# Patient Record
Sex: FEMALE | Race: AMERICAN INDIAN OR ALASKA NATIVE
[De-identification: names, ages, dates, MRNs, and addresses within clinical notes are randomized per-mention and may not be internally consistent; named-entity substitution may affect disease eponyms.]

---

## 2017-04-04 NOTE — ADMIT CRITERIA FORM
Admission Criteria Documentation: 





                            CARDIOLOGY GRG





Clinical Indications for Admission to Inpatient Care


    


                                                                               (

Place 'X' for any and all applicable criteria): 





Hospital admission is needed for appropriate care of the patient because of ANY 

ONE of the following (1): 





[ ] I.    Hemodynamic instability as indicated by ALL of the following (1)(2)(3)

(4)(5)


         [ ]a)   Vital signs or other findings not as expected for chronic 

patient condition or baseline


         [ ]b)   Instability indicated by ANY ONE of the following:


                  [ ]i)     Hypotension


                  [ ]ii)    Symptomatic Tachycardia unresponsive to treatment (

e.g., analgesia, fluids, sedation as indicated)


                  [ ]iii)   Inadequate perfusion indicated by ANY ONE of the 

following:


                            [ ] 1)   Lactic acidosis (> 2 mmol/L)


                            [ ] 2)   New abnormal capillary refill (> 3 seconds)


                            [ ] 3)   Reduced urine output


                            [ ] 4)   New altered mental status


                  [ ]iv)   Orthostatic vital sign changes unresponsive to 

treatment (e.g., fluids)              


                  [ ]v)    IV inotropic or vasopressor medication required to 

maintain adequate blood pressure or perfusion


[ ] II.  Severe heart failure as indicated by ANY ONE of the following(17)(18)


         [ ]a)  Respiratory distress        


         [ ]b)  Hypotension


         [ ]c)  Anasarca (refractory to outpatient therapy) 


         [ ]d)  Cardiac arrhythmias of immediate concern 


         [ ]e)  Myocardial ischemia


[ ] III. Cardiac arrhythmias or findings of immediate concern indicated by ANY 

ONE of the following (19)(20):


         [ ] a)  Heart rhythms that are inherently dangerous or unstable 

indicated by ANY ONE of the following (21)(22)(23):


                 [ ] i)    Resuscitated ventricular fibrillation or cardiac 

arrest


                 [ ] ii)   Ventricular escape rhythm


                 [ ] iii)  Sustained ventricular tachycardia (30 seconds or 

more of ventricular rhythm at greater than 100 beats per minute)


                 [ ] iv)  Nonsustained ventricular tachycardia and ANY ONE of 

the following:


                          [ ] 1)    Suspected cardiac ischemia as cause or 

consequence of ventricular tachycardia    


                          [ ] 2)    In setting of acute myocarditis         


         [ ] b)  Unstable cardiac conduction defects indicated by ANY ONE of 

the following(23)(24)(25)


                  [ ] i)    Type II second-degree atrioventricular block    


                  [ ]ii)    Third-degree atrioventricular block                


                  [ ]iii)    New-onset left bundle branch block with suspected 

myocardial ischemia


         [ ]c)   Any heart rhythm and ANY ONE of the following (21)(22)(26)(27) 

(28)


                  [ ] i)    Continuous long-term ECG monitoring needed (e.g., 

initiation of drug requiring monitoring for more than 24 hours)


                  [ ] ii)   Patient has automatic implanted cardioverter 

defibrillator that is repeatedly firing, malfunctioning, or in need of 

immediate 


                            adjustment of settings beyond the scope of 

ambulatory or observation care


        [ ]d)    Heart rhythms of concern due to ANY ONE of the following:


                  [ ] i)    Hypotension


                  [ ] ii)    Respiratory distress


                  [ ] iii)   Association with other significant symptoms (e.g., 

bradycardia with syncope or ongoing dizziness, supraventricular 


                            tachycardia with chest pain (14)(15)(17)


[ ] IV.   Monitoring for cardiac contusion beyond the scope of observation care 

needed [A](30)(31)(32)


[ ] V.    Surgical or device complication (e.g., valve replacement complication

, pacemaker dysfunction) (35)(41)(44)(45)(46)


[ ] VI.   Inpatient palliative care needed. [B](49) Also use Inpatient 

Palliative Care Criteria


[ ] VII.  Nonbacterial thrombotic (marantic) endocarditis (36)(43)(47)(48)


[ X] VIII. Cardiology condition, symptom, or finding for which emergency and 

observation care has failed or are not considered appropriate.


[ ] IX.   Acute valvular disease requiring inpatient as indicated by ANY ONE of 

the following (41)


          [ ]a)   Acute valvular regurgitation (42)


          [ ]b)   Noninfectious valvulitis (43)


          [ ]c)   Obstructive valve thrombosis 


          [ ]d)   Paravalvular leak


          [ ]e)   Other significant valvular disorder remaining after emergency 

or observation level of care (as appropriate)


[ ]X.    Pericardial disease requiring inpatient treatment as indicated by ANY 

ONE of the following (33)(34)(35)(36)(37)           


          [ ]a)   Suspected tamponade (38)(39)(40)


          [ ]b)   Hemopericardium


          [ ]c)   Other significant pericardial disorder remaining after 

emergency or observation level of care (as appropriate)


[ ] XI.  Cardiac ischemia beyond scope of emergency and observation care. 


[ ] XII. Hypertension requiring inpatient treatment as indicated by ANY ONE of 

the following (6)(7)(8)


         [ ]a)    SBP greater than 220 mm Hg or DBP greater than 120 mmHg 

despite treatment


         [ ]b)    SBP greater than 140 mm Hg or DBP greater than 100 mm Hg with 

evidence of acute end organ damage as indicated


                   by ANY ONE of the following


                   [ ] i)      Altered mental status


                   [ ] ii)     Acute renal failure as indicated by new onset of 

ANY ONE of the following (9)(10)(11)(12)(13)


                               [ ]1)  3-fold rise in serum creatinine from 

baseline


                               [ ]2)  Serum creatinine greater than 4 mg/dL (

354 micromoles/L) with acute rise greater than 0.5 mg/dL (44.2 micromoles/L)


                               [ ]3)  Reduction of more than 75% in estimated 

glomerular filtration rate from baseline 


                               [ ]4)  Estimated glomerular filtration rate less 

than 35 mL/min/1.73m2 (0.59 mL/sec/1.73m2) in child up to 18 years of age


                               [ ]5)  Cessation of urine output indicated by 

ALL of the following


                                       [ ]A.   Adequate volume status


                                       [ ]B.   Inadequate urine output as 

indicated by ANY ONE of the following       


                                                [ ]a.   Urine output less than 

0.3 mL/kg/hr for 24 hours


                                                [ ]b.   Anuria (urine output 

less than 0.1 mL/kg/hr) for 12 hours 


                  [ ] iii)      Aortic dissection


                  [ ] iv)      Myocardial Ischemia


                  [ ] v)       Left ventricular heart failure 


                  [ ]vi)       Retinal Hemorrhage


                  [ ]vii)      Other significant finding


          [ ]c)   Hypertension in child requiring inpatient treatment as 

indicated by ALL of the following(14)(15)(16)


                  [ ] i)        Outpatient treatment not effective, not 

available, or not appropriate               


                  [ ]ii)        SBP or DBP greater than 95th percentile for age


                  [ ]iii)       Evidence of acute end organ damage as indicated 

by ANY ONE of the following 


                               [ ]1)     Altered mental status


                               [ ]2)    Acute renal failure as indicated by new 

onset of ANY ONE of the following(9)(10)(11)(12)(13)


                                         [ ]A.    3-fold rise in serum 

creatinine from baseline


                                         [ ]B.    Serum creatinine greater than 

4 mg/dL (354 micromoles/L) with acute rise greater than 0.5 mg/dL (44.2 

micromoles/L)


                                         [ ]C.    Reduction of more than 75% in 

estimated glomerular filtration rate from baseline


                                         [ ]D.    Estimated glomerular 

filtration rate less than 35 mL/min/1.73m2 (0.59 mL/sec/1.73m2) in child up to 

18 years of age 


                                         [ ]E.    Cessation of urine output 

indicated by ALL of the following 


                                                   [ ]a.  Adequate volume status


                                                   [ ]b.  Inadequate urine 

output as indicated by ANY ONE of the following 


                                                           [ ]i)    Urine 

output less than 0.3 mL/kg/hr for 24 hours


                                                           [ ]ii)   Anuria (

urine output less than 0.1 mL/kg/hr) for 12 hours                              

  


                               [ ]3)  Severe headache


                               [ ]4)  Visual disturbance 


                               [ ]5)  Retinal hemorrhage


                               [ ]6)  Other significant finding


[ ]XIII.   Complications of transplanted heart indicated by ANY ONE of the 

following(61):


           [ ]a)  Acute graft rejection requiring inpatient management (eg, 

intravenous immunosuppression)(62)(63)


           [ ]b)  Acute graft heart failure indicated by ANY ONE of the 

following(64):


                   [ ]i)   Hemodynamic instability


                   [ ]ii)  Cardiac arrhythmias of immediate concern


                   [ ]iii) Pulmonary edema that is very severe (eg, mechanical 

ventilation needed,


                          imminent or likely, need for 100% oxygen to keep 

oxygen saturation above 90%)


                   [ ]iv) Pulmonary edema that is persistent as indicated by ALL

 of the following:


                          [ ]1)   New need for oxygen therapy to keep oxygen 

saturation above 90% (or increased FiO2 need from baseline)


                          [ ]2)   Has not improved sufficiently with emergency 

department or observation


                                   care IV diuretics or other heart failure 

treatments[E]


                   [ ]v)   Altered mental status that is severe or persistent


                   [ ]vi)   Increased creatinine (new on laboratory test) with 

reduction of more than 50% in


                            estimated glomerular filtration rate from baseline


                   [ ]vii)  Progressively (ongoing) rising creatinine (known 

from past laboratory test) with


                            reduction of more than 25% in estimated glomerular 

filtration rate from baseline


                   [ ]viii) Acute renal failure


                   [ ]ix)  Acute peripheral ischemia (eg, examination shows 

pulseless, cool, mottled, or cyanotic extremity)


                   [ ]x)   Pulmonary artery catheter monitoring needed


                   [ ]xi)  Other sign or symptom of heart failure requiring 

inpatient treatment (ie, too severe or


                            not responsive to outpatient and observation care 

treatment)


        [ ]c)    Infection requiring inpatient management (eg, Hemodynamic 

instability, need for intravenous antimicrobial treatment)(66)(67)(68)(69)(70)


        [ ]d)   Cardiac allograft vasculopathy requiring inpatient management (

eg evidence of cardiac ischemia)(71)


        [ ]e)   Other complication of transplanted heart (eg, stroke, severe 

pulmonary hypertension, severe valvular 


                 dysfunction) requiring inpatient management(72)








The original [facility] [employer] content created by [facility] has been revised. 


The portions of the content which have been revised are identified through the 

use of italic text or in bold, and [facility] 


has neither reviewed nor approved the modified material. All other unmodified 

content is copyright  [facility] [employer].





Please see references footnoted in the original [facility] [employer] edition 

2016





Admission Criteria Met: Yes

## 2017-04-04 NOTE — XRAY REPORT
CHEST TWO VIEWS: 04/04/17 11:35:00



CLINICAL: Shortness of breath.



COMPARISON: None



FINDINGS: Cardiomegaly and redistribution of pulmonary blood flow to 

the upper lobes.  The pulmonary vessels are indistinct.  Mild right 

basal patchy opacities.  No pleural effusion.    The bones and soft 

tissues are normal.



IMPRESSION: CHF with early interstitial pulmonary edema.Right middle 

lobe atelectasis versus airspace disease.

## 2017-04-04 NOTE — EMERGENCY DEPARTMENT REPORT
ED Chest Pain HPI





- General


Chief Complaint: Chest Pain


Stated Complaint: CHEST PAIN/SRIKANTH


Time Seen by Provider: 17 12:54


Source: patient


Mode of arrival: Ambulatory


Limitations: No Limitations





- History of Present Illness


Initial Comments: 





36-year-old female presents to the emergency department complaining of chest 

pain and difficulty breathing.  Patient reports onset of chest pain yesterday 

morning.  She describes a sharp pain in the center of her chest that does not 

radiate.  Symptoms have been constant since onset.  She reports associated 

diaphoresis and nausea.  She also reports cough productive of yellow sputum.  

There has been no fever.  There are no other complaints.


MD Complaint: chest pain


-: Gradual, days(s) (1)


Onset: during rest


Pain Location: substernal


Pain Radiation: none


Severity: severe


Severity scale (0 -10): 8


Quality: sharp


Consistency: constant


Improves With: nothing


Worsens With: nothing


re: nausea, diaphoresis, dyspnea


Other Symptoms: cough


Treatments Prior to Arrival: none


Aspirin use within the Past 7 Days: (0) No





- Related Data


 Home Medications











 Medication  Instructions  Recorded  Confirmed  Last Taken


 


Lisinopril/Hydrochlorothiazide 1 tab PO BID 09/14/15 03/16/16 10/01/15





[Zestoretic 20-12.5 mg]    


 


hydrALAZINE [Apresoline] 50 mg PO BID 09/14/15 03/16/16 10/01/15











 Allergies











Allergy/AdvReac Type Severity Reaction Status Date / Time


 


cyclobenzaprine HCl Allergy  Shortness Verified 17 12:03





[From Flexeril]   of Breath  


 


ibuprofen [From Motrin] Allergy  Swelling Verified 17 12:03


 


epideral medication Allergy  Swelling Uncoded 17 12:03














SHARATH score





- Sharath Score


Age > 65: (0) No


Aspirin use within the Past 7 Days: (0) No


3 or more CAD Risk Factors: (0) No


2 or more Angina events in past 24 hrs: (0) No


Known CAD with more than 50% Stenosis: (0) No


Elevated Cardiac Markers: (0) No


ST Deviation Greater than 0.5mm: (0) No


SHARATH Score: 0





ED Review of Systems


ROS: 


Stated complaint: CHEST PAIN/SRIKANTH


Other details as noted in HPI





Comment: All other systems reviewed and negative


Constitutional: diaphoresis


Respiratory: cough, shortness of breath


Cardiovascular: chest pain


Gastrointestinal: nausea





ED Past Medical Hx





- Past Medical History


Previous Medical History?: Yes


Hx Hypertension: Yes


Hx CVA: No


Hx Congestive Heart Failure: No


Hx Diabetes: No


Hx Asthma: No


Hx COPD: No





- Surgical History


Past Surgical History?: Yes


Hx Cholecystectomy: Yes


Additional Surgical History: "Ovarian surgery",.   x 4.  TUBAL LIGATION





- Family History


Family history: no significant





- Social History


Smoking Status: Former Smoker


Substance Use Type: None





- Medications


Home Medications: 


 Home Medications











 Medication  Instructions  Recorded  Confirmed  Last Taken  Type


 


Lisinopril/Hydrochlorothiazide 1 tab PO BID 09/14/15 03/16/16 10/01/15 History





[Zestoretic 20-12.5 mg]     


 


hydrALAZINE [Apresoline] 50 mg PO BID 09/14/15 03/16/16 10/01/15 History














ED Physical Exam





- General


Limitations: No Limitations


General appearance: alert, in no apparent distress





- Head


Head exam: Present: atraumatic, normocephalic





- Eye


Eye exam: Present: normal appearance, PERRL, EOMI





- ENT


ENT exam: Present: normal exam, normal orophraynx, mucous membranes moist





- Neck


Neck exam: Present: normal inspection, full ROM.  Absent: tenderness





- Respiratory


Respiratory exam: Present: normal lung sounds bilaterally.  Absent: respiratory 

distress





- Cardiovascular


Cardiovascular Exam: Present: regular rate, normal rhythm, normal heart sounds





- GI/Abdominal


GI/Abdominal exam: Present: soft, normal bowel sounds.  Absent: distended, 

tenderness





- Extremities Exam


Extremities exam: Present: normal inspection, full ROM.  Absent: tenderness





- Back Exam


Back exam: Present: normal inspection, full ROM.  Absent: tenderness





- Neurological Exam


Neurological exam: Present: alert, oriented X3.  Absent: motor sensory deficit





- Skin


Skin exam: Present: warm, dry, intact





ED Course





 Vital Signs











  17





  12:05 12:49 13:01


 


Temperature 98.3 F  


 


Pulse Rate 100 H 98 H 98 H


 


Respiratory 26 H  15





Rate   


 


Blood Pressure 208/142  204/151


 


O2 Sat by Pulse 100  100





Oximetry   














  17





  13:11 13:20


 


Temperature  


 


Pulse Rate  97 H


 


Respiratory 18 





Rate  


 


Blood Pressure  193/136


 


O2 Sat by Pulse  





Oximetry  














ED Medical Decision Making





- Lab Data


Result diagrams: 


 17 12:14





 17 12:14





- EKG Data


-: EKG Interpreted by Me


EKG shows normal: sinus rhythm, axis, intervals


Rate: normal





- EKG Data


When compared to previous EKG there are: previous EKG unavailable


Interpretation: nonspecific ST-T wave sen, LVH





- Radiology Data


Radiology results: report reviewed, image reviewed





Chest x-ray shows findings consistent with CHF.





- Medical Decision Making





Lab and imaging results reviewed and discussed with the patient.  I've spoken 

with cardiology.  Patient is to be admitted by the hospitalist.





- Differential Diagnosis


ACS, hypertensive crisis, CHF


Critical care attestation.: 


If time is entered above; I have spent that time in minutes in the direct care 

of this critically ill patient, excluding procedure time.








ED Disposition


Clinical Impression: 


CHF (congestive heart failure)


Qualifiers:


 Congestive heart failure type: unspecified congestive heart failure type 

Congestive heart failure chronicity: acute Qualified Code(s): I50.9 - Heart 

failure, unspecified





Disposition: OP ADMITTED AS IP TO THIS HOSP


Is pt being admited?: Yes


Condition: Stable


Referrals: 


ERINNE,SAMUEL C, MD [Primary Care Provider] - 3-5 Days


Time of Disposition: 14:15

## 2017-04-04 NOTE — EVENT NOTE
Date: 04/04/17


See H/p in reports


Hypertensive emergency


Acute CHF exacerbation


Chest pain-r/o MI

## 2017-04-04 NOTE — CONSULTATION
History of Present Illness


Consult date: 17


Requesting physician: NIDA PELAYO


Consult reason: congestive heart failure


History of present illness: 


The patient is a 36-year-old -American female with a past medical 

history significant for hypertension.  She is previously unknown to our 

practice.  She presented with complaints of chest pain, diaphoresis, shortness 

of breath, and orthopnea 2 days prior to arrival.  She reports that her 

symptoms began gradually and have progressively worsened over the course of 2 

days.  She describes her chest pain as a nonexertional, nonradiating, 

intermittent midsternal stabbing pain which is aggravated by deep inspiration 

and reproducible with palpation.  She denies any fever, chills, cough, 

palpitations, nausea, vomiting, dizziness, or syncope.  She denies any recent 

illness.  She also reports some intermittent bilateral ankle swelling over the 

past few days.  She reports compliance with her antihypertensive medication 

regimen.  On arrival to the ED, her initial BP was noted to be 208/142; CXR 

revealed early interstitial pulmonary edema; pro-BNP 4800; troponin negative x 

1 set. 








Past History


Past Medical History: hypertension


Past Surgical History: Other (tubal ligation;  x 4)


Social history: no significant social history.  denies: smoking, alcohol abuse, 

prescription drug abuse


Family history: no significant family history





Medications and Allergies


 Allergies











Allergy/AdvReac Type Severity Reaction Status Date / Time


 


cyclobenzaprine HCl Allergy  Shortness Verified 17 12:03





[From Flexeril]   of Breath  


 


ibuprofen [From Motrin] Allergy  Swelling Verified 17 12:03


 


epideral medication Allergy  Swelling Uncoded 17 12:03











 Home Medications











 Medication  Instructions  Recorded  Confirmed  Last Taken  Type


 


Lisinopril/Hydrochlorothiazide 1 tab PO BID 09/14/15 04/04/17 10/01/15 History





[Zestoretic 20-12.5 mg]     


 


hydrALAZINE [Apresoline] 50 mg PO DAILY 09/14/15 04/04/17 10/01/15 History











Active Meds: 


Active Medications





Nitroglycerin/Dextrose (Tridil Drip 50mg/250ml)  50 mg in 250 mls @ 3 mls/hr IV 

TITR ONE; 10 MCG/MIN


   PRN Reason: Protocol


   Stop: 17 01:38


   Last Admin: 17 14:30 Dose:  10 mcg/min, 3 mls/hr


Nitroglycerin (Nitrostat)  0.4 mg SL .Q5MIN PRN


   PRN Reason: Chest Pain


   Last Admin: 17 14:04 Dose:  0.4 mg











Review of Systems


Constitutional: sweats, no weight loss, no weight gain, no fever, no chills


Ears, nose, mouth and throat: no ear pain, no ear discharge, no nose pain, no 

nasal congestion, no sinus pressure, no sinus pain, no epistaxis, no bleeding 

gums, no dental pain, no mouth pain, no dysphagia, no hoarseness, no sore throat


Cardiovascular: chest pain, orthopnea, edema, shortness of breath, high blood 

pressure, leg edema, no palpitations, no rapid/irregular heart beat, no syncope

, no lightheadedness, no dyspnea on exertion


Respiratory: shortness of breath, dyspnea on exertion, pain on inspiration, no 

cough, no congestion, no wheezing, no sleep apnea


Gastrointestinal: no abdominal pain, no nausea, no vomiting, no diarrhea, no 

constipation, no change in bowel habits


Genitourinary Female: no dyspareunia, no dysmenorrhea, no pelvic pain, no flank 

pain, no menorrhagia, no dysuria, no urinary frequency, no urgency


Musculoskeletal: no neck stiffness, no neck pain, no shooting arm pain, no arm 

numbness/tingling, no low back pain, no shooting leg pain, no leg numbness/

tingling


Integumentary: no rash, no pruritis, no redness, no sores, no wounds


Neurological: no head injury, no weakness, no parathesias, no numbness, no 

tingling, no seizures, no syncope


Psychiatric: no anxiety


Endocrine: no cold intolerance, no heat intolerance


Hematologic/Lymphatic: no easy bruising, no easy bleeding, no lymphadenopathy


Allergic/Immunologic: no urticaria, no wheezing, no persistent infections





Physical Examination





 Last Vital Signs











Temp  98.3 F   17 12:05


 


Pulse  102 H  17 14:15


 


Resp  20   17 14:15


 


BP  200/138   17 14:15


 


Pulse Ox  96   17 14:15








 








General appearance: mild distress


HEENT: Positive: PERRL, Normocephaly, Mucus Membranes Moist


Neck: Positive: neck supple, trachea midline


Cardiac: Positive: Reg Rate and Rhythm, S1/S2


Lungs: Positive: Rales (bibasilar)


Neuro: Positive: Grossly Intact, Cranial Nerve 2-12 Intact


Abdomen: Positive: Unremarkable, Soft, Active Bowel Sounds.  Negative: Tender


Skin: Positive: Clear.  Negative: Rash, Wound


Extremities: Present: upper extr. pulses, lower extr. pulses.  Absent: edema





Results





 17 12:14





 17 12:14





- Imaging and Cardiology


Echo: pending


EKG: report reviewed, image reviewed





EKG interpretations





- Telemetry


EKG Rhythm: Sinus Rhythm





- EKG


Sinus rhythms and dysrhythmias: sinus rhythm


Repolarization changes or abnormalities: nonspecific abnormality, ST segment, 

and/or T wave





Assessment and Plan


Assessment:


Chest pain, atypical - reproducible with palpation; ECG with NAF; troponin 

negative x 1 set. 


Acute heart failure


Hypertensive urgency 





Plan:


Agree with nitro gtt per primary. Wean off for SBP <160mmHg. 


Initiate Lopressor, 50mg PO BID. Titrate as tolerated. 


Initiate lisinopril, 40mg daily, and HCTZ, 25mg daily. 


Initiate diuresis with IV lasix, 40mg BID. Repeat BMP in AM. 


Obtain echo. 


Repeat EKG in AM. 


Cont to trend troponins. 


Obtain fasting lipid panel in AM. 


Cont tele. 


Tx to ICU. 


Assessment and plan reviewed with pt at bedside. 





The patient has been seen in conjunction with Dr. Tracey who agrees with the 

assessment and plan of care.

## 2017-04-05 NOTE — PROGRESS NOTE
Subjective


Date of service: 04/05/17


Principal diagnosis: chest pain; HTN; HF


Interval history: 





Had cardiac cath today





Objective





- Constitutional


Vitals: 


 Vital Signs - 12hr











  04/05/17 04/05/17 04/05/17





  02:08 02:16 02:30


 


Temperature   


 


Pulse Rate  78 75


 


Respiratory 22 18 17





Rate   


 


Blood Pressure  176/114 176/114


 


O2 Sat by Pulse  94 99





Oximetry   














  04/05/17 04/05/17 04/05/17





  02:46 03:00 03:16


 


Temperature   


 


Pulse Rate 80 84 81


 


Respiratory 25 H 26 H 27 H





Rate   


 


Blood Pressure 177/100 177/100 177/100


 


O2 Sat by Pulse 97 93 97





Oximetry   














  04/05/17 04/05/17 04/05/17





  03:30 03:46 04:00


 


Temperature   98.9 F


 


Pulse Rate 79 84 80


 


Respiratory 24 18 21





Rate   


 


Blood Pressure 174/105 197/127 179/93


 


O2 Sat by Pulse 99 96 98





Oximetry   














  04/05/17 04/05/17 04/05/17





  04:16 04:30 04:46


 


Temperature   


 


Pulse Rate 74 77 77


 


Respiratory 25 H 20 24





Rate   


 


Blood Pressure 201/127 201/127 194/110


 


O2 Sat by Pulse 97 96 96





Oximetry   














  04/05/17 04/05/17 04/05/17





  05:00 05:16 05:30


 


Temperature   


 


Pulse Rate 82 79 78


 


Respiratory 20 30 H 26 H





Rate   


 


Blood Pressure 194/110 202/116 202/116


 


O2 Sat by Pulse 96 95 97





Oximetry   














  04/05/17 04/05/17 04/05/17





  05:39 05:46 05:50


 


Temperature   


 


Pulse Rate 77 74 84


 


Respiratory 25 H 19 20





Rate   


 


Blood Pressure 212/125 212/125 180/112


 


O2 Sat by Pulse  94 94





Oximetry   














  04/05/17 04/05/17 04/05/17





  05:56 06:00 06:06


 


Temperature   


 


Pulse Rate 93 H 89 92 H


 


Respiratory 16 30 H 31 H





Rate   


 


Blood Pressure 180/112 131/74 131/74


 


O2 Sat by Pulse 95 99 100





Oximetry   














  04/05/17 04/05/17 04/05/17





  06:10 06:16 06:20


 


Temperature   


 


Pulse Rate 82 74 80


 


Respiratory 17 25 H 24





Rate   


 


Blood Pressure 131/74 134/85 134/85


 


O2 Sat by Pulse 100 98 97





Oximetry   














  04/05/17 04/05/17 04/05/17





  06:26 06:30 06:35


 


Temperature   


 


Pulse Rate 76 78 78


 


Respiratory 25 H 25 H 26 H





Rate   


 


Blood Pressure 161/99 161/99 141/99


 


O2 Sat by Pulse 95 99 99





Oximetry   














  04/05/17 04/05/17 04/05/17





  06:40 06:46 06:50


 


Temperature   


 


Pulse Rate 79 84 83


 


Respiratory 24 22 23





Rate   


 


Blood Pressure 141/99 141/99 147/82


 


O2 Sat by Pulse 98 98 97





Oximetry   














  04/05/17 04/05/17 04/05/17





  06:56 07:00 07:06


 


Temperature   


 


Pulse Rate 85 80 82


 


Respiratory 23 22 14





Rate   


 


Blood Pressure 147/82 147/82 135/80


 


O2 Sat by Pulse 97 95 95





Oximetry   














  04/05/17 04/05/17 04/05/17





  07:10 07:16 07:20


 


Temperature   


 


Pulse Rate 79 80 84


 


Respiratory 25 H 23 25 H





Rate   


 


Blood Pressure 135/80 135/80 131/86


 


O2 Sat by Pulse 96 97 96





Oximetry   














  04/05/17 04/05/17 04/05/17





  07:26 07:30 07:36


 


Temperature   


 


Pulse Rate 83 85 82


 


Respiratory 19 22 21





Rate   


 


Blood Pressure 131/86 131/86 138/90


 


O2 Sat by Pulse 96 96 98





Oximetry   














  04/05/17 04/05/17 04/05/17





  07:40 07:45 07:50


 


Temperature   


 


Pulse Rate 85 85 85


 


Respiratory 23 22 16





Rate   


 


Blood Pressure 138/90 142/88 142/88


 


O2 Sat by Pulse 96 98 94





Oximetry   














  04/05/17 04/05/17 04/05/17





  07:56 08:00 08:36


 


Temperature  98.1 F 


 


Pulse Rate 84  80


 


Respiratory 17  23





Rate   


 


Blood Pressure 142/88  142/88


 


O2 Sat by Pulse  96 98





Oximetry   














  04/05/17 04/05/17 04/05/17





  08:40 08:45 08:50


 


Temperature   


 


Pulse Rate 83 82 85


 


Respiratory 19 22 18





Rate   


 


Blood Pressure 142/88 154/106 154/106


 


O2 Sat by Pulse 98 97 96





Oximetry   














  04/05/17 04/05/17 04/05/17





  08:56 09:00 09:06


 


Temperature   


 


Pulse Rate 81 80 80


 


Respiratory 22 21 21





Rate   


 


Blood Pressure 154/106 154/106 154/106


 


O2 Sat by Pulse 97 98 98





Oximetry   














  04/05/17 04/05/17 04/05/17





  09:10 09:15 09:20


 


Temperature   


 


Pulse Rate 86 85 82


 


Respiratory 17 23 18





Rate   


 


Blood Pressure 154/106 166/104 166/104


 


O2 Sat by Pulse 99 97 98





Oximetry   














  04/05/17 04/05/17 04/05/17





  09:26 09:30 09:36


 


Temperature   


 


Pulse Rate 83 87 84


 


Respiratory 14 17 27 H





Rate   


 


Blood Pressure 166/104 166/104 180/109


 


O2 Sat by Pulse 99 98 94





Oximetry   














  04/05/17 04/05/17 04/05/17





  09:40 09:46 09:50


 


Temperature   


 


Pulse Rate 84 84 83


 


Respiratory 28 H 26 H 20





Rate   


 


Blood Pressure 180/109 182/106 182/106


 


O2 Sat by Pulse 95 95 95





Oximetry   














  04/05/17 04/05/17 04/05/17





  09:56 10:00 10:06


 


Temperature   


 


Pulse Rate 84 75 88


 


Respiratory 25 H 25 H 24





Rate   


 


Blood Pressure 182/106 182/106 183/112


 


O2 Sat by Pulse 96 94 97





Oximetry   














  04/05/17 04/05/17 04/05/17





  10:10 10:16 10:20


 


Temperature   


 


Pulse Rate 83 85 85


 


Respiratory 24 26 H 16





Rate   


 


Blood Pressure 183/112 175/111 175/111


 


O2 Sat by Pulse 98 97 96





Oximetry   














  04/05/17 04/05/17 04/05/17





  12:00 12:18 12:20


 


Temperature   


 


Pulse Rate  76 77


 


Respiratory  17 13





Rate   


 


Blood Pressure   


 


O2 Sat by Pulse 96  100





Oximetry   














  04/05/17 04/05/17





  12:26 12:30


 


Temperature  


 


Pulse Rate 82 80


 


Respiratory 15 11 L





Rate  


 


Blood Pressure 166/108 166/108


 


O2 Sat by Pulse 97 98





Oximetry  











General appearance: Present: no acute distress, well-nourished





- EENT


Eyes: PERRL, EOM intact





- Neck


Neck: supple, normal ROM





- Respiratory


Respiratory effort: normal


Respiratory: bilateral: CTA





- Cardiovascular


Rhythm: regular


Heart Sounds: Present: S1 & S2.  Absent: gallop, rub


Extremities: pulses intact, No edema, normal color, Full ROM





- Gastrointestinal


General gastrointestinal: Present: soft, non-tender





- Genitourinary


Female genitourinary: normal





- Integumentary


Integumentary: clear, warm, dry





- Musculoskeletal


Musculoskeletal: 1, strength equal bilaterally





- Neurologic


Neurologic: moves all extremities





- Psychiatric


Psychiatric: memory intact, appropriate mood/affect, intact judgment & insight





- Labs


CBC & Chem 7: 


 04/04/17 12:14





 04/05/17 03:24


Labs: 


 Abnormal lab results











  04/04/17 04/05/17 Range/Units





  17:37 00:44 


 


CK-MB (CK-2) Rel Index  4.7 H   (0-4)  


 


Troponin T  0.031 H D  0.037 H  (0.00-0.029)  ng/mL


 


HDL Cholesterol  28 L   (40-59)  mg/dL

## 2017-04-05 NOTE — PROGRESS NOTE
Assessment and Plan








1. Chest pain, atypical - reproducible with palpation; ECG with NAF; troponin 

negative x 1 set. Had coronary angio. 


2. Acute heart failure:


3. Accelerated HTN: Off NTG gtt








 








Subjective


Date of service: 04/05/17


Principal diagnosis: chest pain; HTN; HF


Interval history: 





Had cardiac cath today. No more chest pain





Objective





- Constitutional


Vitals: 


 Vital Signs - 12hr











  04/05/17 04/05/17 04/05/17





  02:16 02:30 02:46


 


Temperature   


 


Pulse Rate 78 75 80


 


Respiratory 18 17 25 H





Rate   


 


Blood Pressure 176/114 176/114 177/100


 


O2 Sat by Pulse 94 99 97





Oximetry   














  04/05/17 04/05/17 04/05/17





  03:00 03:16 03:30


 


Temperature   


 


Pulse Rate 84 81 79


 


Respiratory 26 H 27 H 24





Rate   


 


Blood Pressure 177/100 177/100 174/105


 


O2 Sat by Pulse 93 97 99





Oximetry   














  04/05/17 04/05/17 04/05/17





  03:46 04:00 04:16


 


Temperature  98.9 F 


 


Pulse Rate 84 80 74


 


Respiratory 18 21 25 H





Rate   


 


Blood Pressure 197/127 179/93 201/127


 


O2 Sat by Pulse 96 98 97





Oximetry   














  04/05/17 04/05/17 04/05/17





  04:30 04:46 05:00


 


Temperature   


 


Pulse Rate 77 77 82


 


Respiratory 20 24 20





Rate   


 


Blood Pressure 201/127 194/110 194/110


 


O2 Sat by Pulse 96 96 96





Oximetry   














  04/05/17 04/05/17 04/05/17





  05:16 05:30 05:39


 


Temperature   


 


Pulse Rate 79 78 77


 


Respiratory 30 H 26 H 25 H





Rate   


 


Blood Pressure 202/116 202/116 212/125


 


O2 Sat by Pulse 95 97 





Oximetry   














  04/05/17 04/05/17 04/05/17





  05:46 05:50 05:56


 


Temperature   


 


Pulse Rate 74 84 93 H


 


Respiratory 19 20 16





Rate   


 


Blood Pressure 212/125 180/112 180/112


 


O2 Sat by Pulse 94 94 95





Oximetry   














  04/05/17 04/05/17 04/05/17





  06:00 06:06 06:10


 


Temperature   


 


Pulse Rate 89 92 H 82


 


Respiratory 30 H 31 H 17





Rate   


 


Blood Pressure 131/74 131/74 131/74


 


O2 Sat by Pulse 99 100 100





Oximetry   














  04/05/17 04/05/17 04/05/17





  06:16 06:20 06:26


 


Temperature   


 


Pulse Rate 74 80 76


 


Respiratory 25 H 24 25 H





Rate   


 


Blood Pressure 134/85 134/85 161/99


 


O2 Sat by Pulse 98 97 95





Oximetry   














  04/05/17 04/05/17 04/05/17





  06:30 06:35 06:40


 


Temperature   


 


Pulse Rate 78 78 79


 


Respiratory 25 H 26 H 24





Rate   


 


Blood Pressure 161/99 141/99 141/99


 


O2 Sat by Pulse 99 99 98





Oximetry   














  04/05/17 04/05/17 04/05/17





  06:46 06:50 06:56


 


Temperature   


 


Pulse Rate 84 83 85


 


Respiratory 22 23 23





Rate   


 


Blood Pressure 141/99 147/82 147/82


 


O2 Sat by Pulse 98 97 97





Oximetry   














  04/05/17 04/05/17 04/05/17





  07:00 07:06 07:10


 


Temperature   


 


Pulse Rate 80 82 79


 


Respiratory 22 14 25 H





Rate   


 


Blood Pressure 147/82 135/80 135/80


 


O2 Sat by Pulse 95 95 96





Oximetry   














  04/05/17 04/05/17 04/05/17





  07:16 07:20 07:26


 


Temperature   


 


Pulse Rate 80 84 83


 


Respiratory 23 25 H 19





Rate   


 


Blood Pressure 135/80 131/86 131/86


 


O2 Sat by Pulse 97 96 96





Oximetry   














  04/05/17 04/05/17 04/05/17





  07:30 07:36 07:40


 


Temperature   


 


Pulse Rate 85 82 85


 


Respiratory 22 21 23





Rate   


 


Blood Pressure 131/86 138/90 138/90


 


O2 Sat by Pulse 96 98 96





Oximetry   














  04/05/17 04/05/17 04/05/17





  07:45 07:50 07:56


 


Temperature   


 


Pulse Rate 85 85 84


 


Respiratory 22 16 17





Rate   


 


Blood Pressure 142/88 142/88 142/88


 


O2 Sat by Pulse 98 94 





Oximetry   














  04/05/17 04/05/17 04/05/17





  08:00 08:36 08:40


 


Temperature 98.1 F  


 


Pulse Rate  80 83


 


Respiratory  23 19





Rate   


 


Blood Pressure  142/88 142/88


 


O2 Sat by Pulse 96 98 98





Oximetry   














  04/05/17 04/05/17 04/05/17





  08:45 08:50 08:56


 


Temperature   


 


Pulse Rate 82 85 81


 


Respiratory 22 18 22





Rate   


 


Blood Pressure 154/106 154/106 154/106


 


O2 Sat by Pulse 97 96 97





Oximetry   














  04/05/17 04/05/17 04/05/17





  09:00 09:06 09:10


 


Temperature   


 


Pulse Rate 80 80 86


 


Respiratory 21 21 17





Rate   


 


Blood Pressure 154/106 154/106 154/106


 


O2 Sat by Pulse 98 98 99





Oximetry   














  04/05/17 04/05/17 04/05/17





  09:15 09:20 09:26


 


Temperature   


 


Pulse Rate 85 82 83


 


Respiratory 23 18 14





Rate   


 


Blood Pressure 166/104 166/104 166/104


 


O2 Sat by Pulse 97 98 99





Oximetry   














  04/05/17 04/05/17 04/05/17





  09:30 09:36 09:40


 


Temperature   


 


Pulse Rate 87 84 84


 


Respiratory 17 27 H 28 H





Rate   


 


Blood Pressure 166/104 180/109 180/109


 


O2 Sat by Pulse 98 94 95





Oximetry   














  04/05/17 04/05/17 04/05/17





  09:46 09:50 09:56


 


Temperature   


 


Pulse Rate 84 83 84


 


Respiratory 26 H 20 25 H





Rate   


 


Blood Pressure 182/106 182/106 182/106


 


O2 Sat by Pulse 95 95 96





Oximetry   














  04/05/17 04/05/17 04/05/17





  10:00 10:06 10:10


 


Temperature   


 


Pulse Rate 75 88 83


 


Respiratory 25 H 24 24





Rate   


 


Blood Pressure 182/106 183/112 183/112


 


O2 Sat by Pulse 94 97 98





Oximetry   














  04/05/17 04/05/17 04/05/17





  10:16 10:20 12:00


 


Temperature   


 


Pulse Rate 85 85 


 


Respiratory 26 H 16 





Rate   


 


Blood Pressure 175/111 175/111 


 


O2 Sat by Pulse 97 96 96





Oximetry   














  04/05/17 04/05/17 04/05/17





  12:18 12:20 12:26


 


Temperature   


 


Pulse Rate 76 77 82


 


Respiratory 17 13 15





Rate   


 


Blood Pressure   166/108


 


O2 Sat by Pulse  100 97





Oximetry   














  04/05/17





  12:30


 


Temperature 


 


Pulse Rate 80


 


Respiratory 11 L





Rate 


 


Blood Pressure 166/108


 


O2 Sat by Pulse 98





Oximetry 











General appearance: Present: no acute distress, well-nourished





- EENT


Eyes: PERRL, EOM intact





- Neck


Neck: supple, normal ROM





- Respiratory


Respiratory effort: normal


Respiratory: bilateral: CTA





- Cardiovascular


Rhythm: regular


Heart Sounds: Present: S1 & S2.  Absent: gallop, rub


Extremities: pulses intact, No edema, normal color, Full ROM





- Gastrointestinal


General gastrointestinal: Present: soft, non-tender





- Integumentary


Integumentary: clear, warm, dry





- Musculoskeletal


Musculoskeletal: 1, strength equal bilaterally





- Neurologic


Neurologic: moves all extremities





- Psychiatric


Psychiatric: appropriate mood/affect





- Labs


CBC & Chem 7: 


 04/04/17 12:14





 04/05/17 03:24


Labs: 


 Abnormal lab results











  04/04/17 04/05/17 Range/Units





  17:37 00:44 


 


CK-MB (CK-2) Rel Index  4.7 H   (0-4)  


 


Troponin T  0.031 H D  0.037 H  (0.00-0.029)  ng/mL


 


HDL Cholesterol  28 L   (40-59)  mg/dL

## 2017-04-05 NOTE — HISTORY AND PHYSICAL REPORT
CHIEF COMPLAINT:  Chest pain and difficulty breathing.



HISTORY OF PRESENT ILLNESS:  A 36-year-old female who presents with chest pain

and difficulty breathing, chest pain since yesterday morning, sharp,

retrosternal.  The pain is about 8 on a scale of 1-10, intermittent.  Also

complains of cough productive of yellow sputum.  No fever.  JEFF score is 0. 

She improves with nothing and worsens with nothing.  The quality is sharp. 

__8/10 in intensity.__.



PAST MEDICAL HISTORY:  Hypertension.



CURRENT MEDICATIONS:  Lisinopril/hydrochlorothiazide 20/12.5 b.i.d., hydralazine

50 mg twice a day.



ALLERGIES:  FLEXERIL and IBUPROFEN.



PAST SURGICAL HISTORY:  Cholecystectomy.  Ovarian surgery,  x 4 and

tubal ligation.



FAMILY HISTORY:  Unknown and noncontributory.



SOCIAL HISTORY:  Former smoker.



REVIEW OF SYSTEMS:

CONSTITUTIONAL:  No fever, no sore throat, no weight loss, no weight gain.

HEENT:  No sore throat.

NECK:  No neck stiffness.

CARDIOVASCULAR AND RESPIRATORY:  No shortness of breath, no chest pain, no

palpitations.

GASTROINTESTINAL:  No nausea, no vomiting, no diarrhea.

GENITOURINARY:  No dysuria, no flank pain.

MUSCULOSKELETAL:  No joint pains.  No muscle pains.

CENTRAL NERVOUS SYSTEM:  No syncope, no seizures.

SKIN:  No rashes.

PSYCHIATRIC:  No depression.  No anxiety.

A 14-point review of systems essentially negative.



PHYSICAL EXAMINATION:

VITAL SIGNS:  On examination, young female, cooperative during examination. 

Temperature 98.3, pulse is 100, respiratory rate is 16/min, blood pressure is

208/142 and 204/151.

HEENT:  Unremarkable.  Pupils are equal and reactive.

NECK:  Supple, no lymphadenopathy, no thyromegaly.

CHEST:  Clear to auscultation and percussion.  Good air entry.

CARDIOVASCULAR:  S1, S2 heard.  No gallop, no murmur, no rub.  Apical impulse in

left fifth intercostal space and midclavicular line.

ABDOMEN:  Soft and benign.  No hepatosplenomegaly.  No guarding, no rigidity. 

Hernial orifices are normal.

EXTREMITIES:  Good pedal pulses.  No pedal edema.

CENTRAL NERVOUS SYSTEM:  Alert and oriented x 4, nonfocal exam.

SKIN:  Normal.



LABORATORY DATA:  White count is 8600, H and H is 9.8 and 32.0, platelet count

is 196,000.  Sodium is 136, potassium is 4.2, chloride is 103.6, bicarb is 19,

BUN and creatinine are 14 and 0.9 and glucose is 105.  EKG shows nonspecific

ST-T wave changes, LVH.  Chest x-ray findings consistent with CHF.



ASSESSMENT AND PLAN:

1.  Hypertensive emergency.  The patient is started on nitroglycerin drip. 

Continue nitroglycerin drip and Cardizem drip if necessary.  Optimize blood

pressure medications.

2.  Congestive heart failure exacerbation.  IV Lasix as necessary.

3.  Acute coronary syndrome, chest pain workup.

4.  Deep venous thrombosis prophylaxis, Lovenox 40 mg subcutaneous daily.



CRITICAL CARE STATEMENT:  The high probability of a clinically significant

sudden or life-threatening deterioration of hematologic and respiratory system

required my full and direct attention, intervention and personal management. 

The aggregate critical care time was 35 minutes.  The time is an addition to

time spent performing reported procedures, but includes the following data

review and interpretation:  The patient assessment and monitoring her vital

signs, documentation, medication orders and management.





DD: 2017 23:38

DT: 2017 00:20

JOB# [id number]  [id number]

CLAUDINE/RAGHAV LATHAM

## 2017-04-05 NOTE — CONSULTATION
History of Present Illness


Consult date: 17


Requesting physician: NIDA PELAYO


History of present illness: 





PULMONARY/CCM CONSULT NOTE (Full dictation # [id number])


Please see dictated notes for full details





Past History


Past Medical History: hypertension


Past Surgical History: Other (tubal ligation;  x 4)


Social history: no significant social history.  denies: smoking, alcohol abuse, 

prescription drug abuse


Family history: no significant family history





Medications and Allergies


 Allergies











Allergy/AdvReac Type Severity Reaction Status Date / Time


 


cyclobenzaprine HCl Allergy  Shortness Verified 17 12:03





[From Flexeril]   of Breath  


 


ibuprofen [From Motrin] Allergy  Swelling Verified 17 12:03


 


epideral medication Allergy  Swelling Uncoded 17 12:03











 Home Medications











 Medication  Instructions  Recorded  Confirmed  Last Taken  Type


 


Lisinopril/Hydrochlorothiazide 1 tab PO BID 09/14/15 04/04/17 10/01/15 History





[Zestoretic 20-12.5 mg]     


 


hydrALAZINE [Apresoline] 50 mg PO DAILY 09/14/15 04/04/17 10/01/15 History











Active Meds: 


Active Medications





Acetaminophen (Tylenol)  650 mg PO Q4H PRN


   PRN Reason: Pain, Mild (1-3)


   Last Admin: 17 05:39 Dose:  650 mg


Furosemide (Lasix)  40 mg IV 0600,1800 [facility]


   Last Admin: 17 05:39 Dose:  40 mg


Hydralazine HCl (Apresoline)  20 mg IV Q4HR [facility]


   Last Admin: 17 05:39 Dose:  20 mg


Hydrochlorothiazide (Hctz)  12.5 mg PO BID [facility]


Nitroglycerin/Dextrose (Tridil Drip 50mg/250ml)  50 mg in 250 mls @ 3 mls/hr IV 

TITR ONE; 10 MCG/MIN


   PRN Reason: Protocol


   Stop: 17 01:38


   Last Titration: 17 06:37 Dose:  0 mcg/min, 0 mls/hr


Sodium Chloride (Nacl 0.9% 500 Ml)  500 mls @ 50 mls/hr IV AS DIRECT YUNIEL


   Stop: 17 20:59


Lisinopril (Zestril)  20 mg PO BID [facility]


Losartan Potassium (Cozaar)  100 mg PO QDAY [facility]


Metoprolol Tartrate (Lopressor)  50 mg PO BID [facility]


   Last Admin: 17 21:36 Dose:  50 mg


Morphine Sulfate (Morphine)  4 mg IV Q3H PRN


   PRN Reason: Pain , Severe (7-10)


   Last Admin: 17 02:08 Dose:  4 mg


Nitroglycerin (Nitrostat)  0.4 mg SL .Q5MIN PRN


   PRN Reason: Chest Pain


   Last Admin: 17 14:04 Dose:  0.4 mg


Sodium Chloride (Sodium Chloride Flush Syringe 10 Ml)  10 ml IV PRN PRN


   PRN Reason: LINE FLUSH











Physical Examination


Vital signs: 


 Vital Signs











Temp Pulse Resp BP Pulse Ox


 


 98.3 F   100 H  26 H  208/142   100 


 


 17 12:05  17 12:05  17 12:05  17 12:05  17 12:05














Results





- Laboratory Findings


CBC and BMP: 


 17 12:14





 17 03:24


Abnormal lab findings: 


 Abnormal Labs











  17





  17:37 00:44


 


CK-MB (CK-2) Rel Index  4.7 H 


 


Troponin T  0.031 H D  0.037 H


 


HDL Cholesterol  28 L

## 2017-04-05 NOTE — CARDIAC CATHERIZATION REPORT
CARDIAC CATHETERIZATION 



CLINICAL INFORMATION:  The patient is a 36-year-old female with history of

hypertension, who was admitted with chest pain and uncontrolled hypertension. 

Cardiac enzymes were unremarkable; however, EKG showed significant T-wave

changes diffuse, this suggests of ischemia, hence scheduled for cardiac

catheterization for definitive diagnosis and treatment.  The patient is aware of

the procedure, potential complications and alternatives of therapy available.



DESCRIPTION OF PROCEDURE:  The patient was brought to the catheterization

laboratory in a fasting condition.  The right wrist area and forearm thoroughly

cleansed with Betadine solution and sterile drapes were applied.  Local

anesthesia was achieved using 2% Xylocaine.  Right radial artery puncture was

made using 21-gauge arterial puncture needle.  Subsequently, a 5-Austrian sheath

was introduced.  The patient was sedated with IV Versed and fentanyl.  The

patient received 5 mg of intra-arterial verapamil and 3000 units of intravenous

heparin.  Subsequently, using 5-Austrian multipurpose catheter, left coronary

angiography was performed in multiple views followed by a right coronary

angiography in multiple views.  Left ventriculogram was performed using the same

catheter using power injector 24 mL at 8 mL per second.  At the end of the

procedure, catheter and sheath were removed.  Good hemostasis was achieved with

pressure bandage.  Following findings were noted.



HEMODYNAMICS:

1.  Opening aortic pressure 187/103, left ventricular pressure 176/11.  No

gradient across the aortic valve.  Estimated ejection fraction 20%.

2.  Left ventriculogram done in [place] projection shows markedly dilated left

ventricle with severe diffuse hypokinesis, ejection fraction was estimated to be

30%.  No significant mitral regurgitation noted.  Right coronary artery dominant

vessel arises normally from right coronary cusp, angiographically smooth and

normal.

3.  Left coronary artery arises normally from left coronary cusp.  Left main,

LAD and its branches, circumflex artery and its branches are angiographically

smooth and normal.



FINAL IMPRESSION:  Dilated left ventricle with severe diffuse hypokinesis. 

Ejection fraction 20 % and normal coronary anatomy.  At this time, the patient

appears to have dilated cardiomyopathy may be related to uncontrolled

hypertension, would recommend aggressive medical management and control of blood

pressure.  Procedure was uncomplicated.  Findings were explained to the patient.





DD: 04/05/2017 11:58

DT: 04/05/2017 12:29

JOB# [id number]  [id number]

KAMILLA/RAGHAV LATHAM

## 2017-04-05 NOTE — PROGRESS NOTE
Assessment and Plan


Assessment:


Chest pain, atypical - reproducible with palpation; ECG with NAF; troponin 

negative x 1 set. 


Acute heart failure


Accelerated HTN





Plan:


Nitro gtt weaned off. 


Repeat EKG this AM revealed new t-wave inversions in leads III, aVF, V1-V5. 


Cont current anti-hypertensive regimen. 


Cont diuresis with IV lasix, 40mg BID. Repeat BMP in AM. 


Obtain echo. 


Cont tele. 


In setting of EKG changes overnight, proceed with coronary angiography this AM. 


Indications, potential risks, and benefits of LHC reviewed with pt and pt is 

agreeable to proceed. Consents obtained. 


Assessment and plan reviewed with pt at bedside. 





The patient has been seen in conjunction with Dr. Tracey who agrees with the 

assessment and plan of care. 








Subjective


Date of service: 04/05/17


Principal diagnosis: chest pain; HTN; HF


Interval history: 


Pt resting comfortably in bed, states she is feeling much better. BPs remain 

mildly elevated but improved, nitro gtt weaned off since ~0630AM. In SR on tele 

with no acute events overnight. 








Objective





 Last Vital Signs











Temp  98.9 F   04/05/17 04:00


 


Pulse  78   04/05/17 06:35


 


Resp  26 H  04/05/17 06:35


 


BP  141/99   04/05/17 06:35


 


Pulse Ox  99   04/05/17 06:35








 








- Physical Examination


HEENT: Positive: PERRL, Normocephaly, Mucus Membranes Moist


Neck: Positive: neck supple, trachea midline


Cardiac: Positive: Reg Rate and Rhythm, S1/S2


Lungs: Positive: clear to auscultation


Neuro: Positive: Grossly Intact, Cranial Nerve 2-12 Intact


Abdomen: Positive: Unremarkable, Soft, Active Bowel Sounds.  Negative: Tender


Skin: Positive: Clear.  Negative: Rash, Wound


Extremities: Present: upper extr. pulses, lower extr. pulses.  Absent: edema





- Labs and Meds


 Cardiac Enzymes











  04/04/17 04/05/17 Range/Units





  17:37 00:44 


 


CK-MB (CK-2)  2.6  2.1  (0.0-4.0)  ng/mL








 Lipids











  04/04/17 Range/Units





  17:37 


 


Triglycerides  71  (2-149)  mg/dL


 


Cholesterol  100  ()  mg/dL


 


HDL Cholesterol  28 L  (40-59)  mg/dL


 


Cholesterol/HDL Ratio  3.57  %








 Comprehensive Metabolic Panel











  04/05/17 Range/Units





  03:24 


 


Sodium  140  (137-145)  mmol/L


 


Potassium  3.6  (3.6-5.0)  mmol/L


 


Chloride  98.9  ()  mmol/L


 


Carbon Dioxide  23  (22-30)  mmol/L


 


BUN  13  (7-17)  mg/dL


 


Creatinine  0.9  (0.7-1.2)  mg/dL


 


Glucose  74  ()  mg/dL


 


Calcium  8.7  (8.4-10.2)  mg/dL














- Imaging and Cardiology


EKG: report reviewed, image reviewed


Echo: pending





- Telemetry


EKG Rhythm: Sinus Rhythm





- EKG


Sinus rhythms and dysrhythmias: sinus rhythm


Repolarization changes or abnormalities: nonspecific abnormality, ST segment, 

and/or T wave

## 2017-04-05 NOTE — EVENT NOTE
Date: 04/05/17


s/p LHC which revealed normal coronaries, dilated NICMP, EF 20%. 


Currently stable cardiac status. Pt may tx out of ICU to tele. 


Await echo. 





JOSE ANTONIO ZHU NP / DR. PRESTON

## 2017-04-06 NOTE — CONSULTATION
CONSULTING PHYSICIAN:  Dr. Catherine.



REASON FOR CONSULTATION:  Critical care.



CHIEF COMPLAINT AND HISTORY OF PRESENT ILLNESS:  The patient is a 36-year-old

-American female with past medical history significant only for a

diagnosis of hypertension who came into the Emergency Room complaining of chest

pain and difficulty breathing, cough productive of nonbloody phlegm that had

gradually developed on her over the 2-3 day period.  She described it as sharp

pain in the center of her chest, did not radiate, had been constant.  She had

diaphoresis and nausea with it.  As mentioned, there was the cough with

yellowish phlegm, no hemoptysis.  Denied any fevers or chills.  Denied sick

contacts.  She was evaluated in the Emergency Room.  Amongst other things, she

was diagnosed with congestive heart failure and acute coronary syndrome as well

as hypertensive crisis.  She required IV medications to control her blood

pressure hence the request for ICU admission.  She was also evaluated by

Cardiology and it seems like after their evaluation, a decision was made to do a

left heart catheterization.  When I stopped by to see her, she was just coming

from the cardiac catheterization.  She was feeling a little bit better overall. 

Denied any acute chest pains.  Denied nausea, vomiting.  She had eaten a meal

and kept it down.  Denied any fevers or chills.  Now with regards to her tobacco

use/abuse history, she states she used to smoke, about a 10-pack year tobacco

smoking history, I should less than a 10 pack year tobacco smoking history, she

says she has quit smoking now.  That really was as much of the history of

presentation as I have.



PAST MEDICAL HISTORY:  Hypertension.  She is obese.



PAST SURGICAL HISTORY:  She has had ovarian surgery and she has had 

sections x 4.



MEDICATIONS:  She was on at the time I stopped by to see her, according to the

medication administration record, included the following:  She was on I believe

on nitroglycerin drip at 10 mcg per minute, Tylenol 650 mg p.o. q.4h. p.r.n.

mild pain, Lasix 40 mg IV b.i.d., hydralazine 20 mg IV q.4h. scheduled, with I

believe hold parameters.  She was on hydrochlorothiazide 12.5 mg p.o. b.i.d.,

lisinopril 20 mg p.o. b.i.d., losartan 100 mg p.o. daily, Lopressor 50 mg p.o.

b.i.d., morphine sulfate 4 mg IV q.3h. p.r.n. severe pain, p.r.n. Nitrostat.



ALLERGIES:  Flexeril and ibuprofen, nature of this allergy is unknown.



DIET:  Obese lady.  Denies significant weight loss or gain in the preceding few

weeks to months.



FAMILY AND SOCIAL HISTORY:  Lives in the community.  Less than 10 pack year

tobacco smoking history.  She has stopped smoking now.  She states she has 8

kids.  Family history otherwise noncontributory.  I believe there is family

history of hypertension.



REVIEW OF SYSTEMS:  No loss of consciousness.  No new onset seizures.  No new

onset focal weakness.  No gross hematochezia or melena.  No gross hematuria or

dysuria.  No hematemesis.  No hemoptysis.  No palpitations.  No new onset

seizures.  Complete review of systems obtained.  Pertinent positives and/or

negatives as in body of history above, otherwise they are noncontributory.



PHYSICAL EXAMINATION:

VITAL SIGNS:  At presentation, she was afebrile, temperature 98.3, pulse was

100, respiratory rate was 26, blood pressure was 208/142, oxygen sats were 100%,

inspired oxygen concentration was not recorded.

HEAD, EYES, EARS, NOSE, AND THROAT:  Pupils are equal, round, about 3-4 mm,

reactive to light.  Extraocular muscle movements are intact.  Oropharynx is a

Mallampati #2-3 Oropharynx.  No significant posterior oropharyngeal erythema. 

Grossly, no palpable lymph nodes in the supraclavicular or submandibular lymph

node chains.

LUNGS:  Auscultation of both lung fields, posterior bibasilar crackles.  No

wheezing.  Slightly prolonged expiratory phase.

HEART:  Sounds 1 and 2 are heard, regular rate and rhythm at the time of my

evaluation.

ABDOMEN:  Soft, full, bowel sounds are positive, nontender.

EXTREMITIES:  Without overt digital clubbing, cyanosis, or pedal edema.

NEUROLOGIC:  The exam was grossly nonfocal.



LABORATORY DATA:  From my review are as follows:  White cell count was 8600,

hemoglobin 9.8, hematocrit 32.0, platelets 196.  Serum sodium was 136, potassium

4.2, chloride 104, bicarbonate 19, BUN 14, creatinine 0.9, glucose 105.  Cardiac

enzymes peaked at 0.037 of the troponin.  BNP was 4806.  LDL cholesterol was 58.





Radiographic studies have been reviewed.  I have also reviewed the radiologist's

interpretation and main finding gross cardiomegaly, increased interstitial

markings consistent with I will say mild-to-moderate pulmonary edema.  I cannot

rule out small bilateral pleural effusions.  No gross pneumothorax, no gross

bony fracture.



ASSESSMENT AND PLAN:  We have a young lady in with an acute CHF exacerbation,

coupled in the setting of hypertensive urgency or emergency.  From a respiratory

standpoint, we will wean oxygen to keep sats greater than or equal to about

92-94%.  Aspiration precautions will be maintained.  Bronchodilators will be on

a p.r.n. basis and hopefully, we can get her completely off oxygen with

diuresis.  Cardiac wise, she has had a cardiac catheterization, I do not believe

any intervention was done.  I will defer to Cardiology.  We do agree with

diuresis and antihypertensives will be titrated to control blood pressures and

keep them in normal range.  From a GI and nutritional standpoint, oral nutrition

will be the feeding modality of choice.  I will put her on GI prophylaxis right

now and aspiration precautions will be maintained.  From a renal standpoint, no

major electrolyte abnormalities.  We agree with diuresis.  We will continue to

follow electrolytes as well as inputs and outputs and electrolytes will be

corrected as necessary.  From a neurologic standpoint, no gross new focal

deficits that may warrant neuro imaging.  She is alert and oriented x 3 at this

point.  We will follow her clinically.  From an infectious disease standpoint,

no signs and symptoms for overwhelming sepsis.  No acute indication for

anti-infectives.  We will follow her clinically.  From a general and hospital

healthcare maintenance standpoint, she will be placed on GI as well as DVT

prophylaxis.  Flu and pneumonia vaccination will be per protocol.  Cardiac wise,

I should mention she will be weaned off Tridil.



Thank you very much for the consult Dr. Catherine.  We will follow along and make

further recommendations as picture progresses/becomes clearer.  At this point,

she is critically ill on life-sustaining interventions including the

nitroglycerin drip at risk for further deterioration.  Once she is cleared by

Cardiology, she will be transferred to the telemetry floor as well as once she

is off the Tridil drip.  At this time, I spent about 30-35 minutes of care.





DD: 2017 16:39

DT: 2017 00:29

JOB# [id number]  [id number]

AJM/RAGHAV

## 2017-06-10 NOTE — XRAY REPORT
FINAL REPORT



EXAM:  XR CHEST ROUTINE 2V



HISTORY:  Shortness of breath 



TECHNIQUE:  2 view examination of the chest



PRIORS:  None



FINDINGS:  

There is no pulmonary consolidation, effusion, or pneumothorax.

The cardiac silhouette size is moderately enlarged without

evidence of vascular congestion or pulmonary edema.  The regional

skeleton is without acute pathology.



IMPRESSION:  

No evidence of acute cardiopulmonary disease



Moderate cardiomegaly. The differential includes pericardial

effusion

## 2017-06-11 NOTE — EMERGENCY DEPARTMENT REPORT
<MARIE CONKLIN VINCENT - Last Filed: 17 00:12>





ED Lower Extremity HPI





- General


Chief Complaint: Extremity Injury, Lower


Stated Complaint: RT FOOT PAIN


Time Seen by Provider: 06/10/17 18:45


Source: patient


Mode of arrival: Ambulatory


Limitations: No Limitations





- Related Data


 Home Medications











 Medication  Instructions  Recorded  Confirmed  Last Taken


 


Lisinopril/Hydrochlorothiazide 1 tab PO BID 09/14/15 04/04/17 10/01/15





[Zestoretic 20-12.5 mg]    


 


hydrALAZINE [Apresoline TAB] 50 mg PO DAILY 09/14/15 04/04/17 10/01/15








 Previous Rx's











 Medication  Instructions  Recorded  Last Taken  Type


 


Cephalexin [Keflex] 500 mg PO Q12HR #20 cap 17 Unknown Rx











 Allergies











Allergy/AdvReac Type Severity Reaction Status Date / Time


 


cyclobenzaprine HCl Allergy  Shortness Verified 17 12:03





[From Flexeril]   of Breath  


 


ibuprofen [From Motrin] Allergy  Swelling Verified 17 12:03


 


epideral medication Allergy  Swelling Uncoded 17 12:03














ED Review of Systems


ROS: 


Stated complaint: RT FOOT PAIN


Other details as noted in HPI





Comment: All other systems reviewed and negative


Constitutional: no symptoms reported, see HPI.  denies: chills


Eyes: as per HPI.  denies: eye pain


ENT: as per HPI.  denies: ear pain, throat pain


Respiratory: no symptoms reported, see HPI.  denies: cough, orthopnea


Cardiovascular: as per HPI.  denies: chest pain, palpitations, dyspnea on 

exertion, orthopnea, edema, syncope, paroxysmal nocturnal dyspnea


Endocrine: no symptoms reported, see HPI.  denies: excessive sweating, flushing

, intolerance to cold, intolerance to heat


Gastrointestinal: as per HPI.  denies: abdominal pain, nausea, vomiting


Genitourinary: as per HPI.  denies: urgency, dysuria


Musculoskeletal: as per HPI.  denies: back pain


Skin: as per HPI, other (pt has a wound to dwight right foot p picking at it. it 

had a large blister than popped upon soaking in ns/betadine).  denies: rash, 

lesions, change in color, change in hair/nails, pruritus


Neurological: as per HPI.  denies: headache, weakness, numbness, paresthesias, 

confusion, abnormal gait, vertigo


Psychiatric: as per HPI.  denies: anxiety, depression, auditory hallucinations, 

visual hallucinations, homicidal thoughts


Hematological/Lymphatic: as per HPI.  denies: easy bleeding, easy bruising, 

swollen glands





ED Past Medical Hx





- Past Medical History


Previous Medical History?: Yes


Hx Hypertension: Yes


Hx CVA: No


Hx Heart Attack/AMI: No


Hx Congestive Heart Failure: Yes


Hx Diabetes: No


Hx Deep Vein Thrombosis: No


Hx Pulmonary Embolism: No


Hx GERD: No


Hx Liver Disease: No


Hx Renal Disease: No


Hx of Cancer: No


Hx Sickle Cell Disease: No


Hx Arthritis: No


Hx Headaches / Migraines: No


Hx Seizures: No


Hx Kidney Stones: No


Hx Psychiatric Treatment: No


Hx Asthma: No


Hx COPD: No


Hx Tuberculosis: No


Hx Dementia: No


Hx HIV: No





- Surgical History


Past Surgical History?: Yes


Hx Coronary Stent: No


Hx Open Heart Surgery: No


Hx Pacemaker: No


Hx Internal Defibrillator: No


Hx Cholecystectomy: Yes


Hx Appendectomy: No


Hx Breast Surgery: No


Additional Surgical History: "Ovarian surgery",.   x 4.  TUBAL LIGATION





- Family History


Family history: no significant (mom and dad a/w)





- Social History


Smoking Status: Never Smoker


Substance Use Type: None, Non Opiate Pain





- Medications


Home Medications: 


 Home Medications











 Medication  Instructions  Recorded  Confirmed  Last Taken  Type


 


Lisinopril/Hydrochlorothiazide 1 tab PO BID 09/14/15 04/04/17 10/01/15 History





[Zestoretic 20-12.5 mg]     


 


hydrALAZINE [Apresoline TAB] 50 mg PO DAILY 09/14/15 04/04/17 10/01/15 History


 


Cephalexin [Keflex] 500 mg PO Q12HR #20 cap 17  Unknown Rx














ED Physical Exam





- General


Limitations: No Limitations


General appearance: alert, in no apparent distress





- Head


Head exam: Present: atraumatic





- Eye


Eye exam: Present: normal appearance, PERRL, EOMI





- ENT


ENT exam: Present: normal exam, normal orophraynx, mucous membranes moist





- Neck


Neck exam: Present: normal inspection.  Absent: tenderness





- Respiratory


Respiratory exam: Present: normal lung sounds bilaterally.  Absent: respiratory 

distress, wheezes, rales, rhonchi, stridor, chest wall tenderness, accessory 

muscle use, decreased breath sounds, prolonged expiratory





- Cardiovascular


Cardiovascular Exam: Present: regular rate, normal rhythm, tachycardia, normal 

heart sounds.  Absent: irregular rhythm, systolic murmur, diastolic murmur, rubs

, gallop, clicks, JVD, S3, S4





- GI/Abdominal


GI/Abdominal exam: Present: soft, other (obese).  Absent: distended, tenderness

, guarding, rebound, rigid, normal bowel sounds, diminished bowel sounds, 

hyperactive bowel sounds, hypoactive bowel sounds, organomegaly, mass, bruit, 

pulsatile mass, hernia





- Rectal


Rectal exam: Present: deferred





- 


External exam: Present: normal external exam





- Extremities Exam


Extremities exam: Present: normal inspection, full ROM, normal capillary refill

, pedal edema (tr b).  Absent: tenderness, joint swelling





- Back Exam


Back exam: Present: normal inspection, full ROM.  Absent: tenderness, CVA 

tenderness (R), CVA tenderness (L)





- Neurological Exam


Neurological exam: Present: alert, altered, oriented X3, CN II-XII intact, 

normal gait, reflexes normal.  Absent: abnormal gait, motor sensory deficit





- Psychiatric


Psychiatric exam: Present: normal affect, normal mood.  Absent: depressed, 

agitated, anxious, flat affect, manic, homicidal ideation, suicidal ideation





- Skin


Skin exam: Present: warm, dry, intact, normal color.  Absent: rash, cyanosis, 

diaphoretic, erythema, urticaria, vesicles, petechiae, pallor, abrasion, 

ecchymosis





ED Course





 Vital Signs











  06/10/17 06/10/17 06/10/17





  13:05 13:21 19:21


 


Temperature 97.8 F  97 F L


 


Pulse Rate 117 H 117 H 112 H


 


Respiratory 22  18





Rate   


 


Blood Pressure 210/154 210/154 


 


Blood Pressure   206/149





[Left]   


 


O2 Sat by Pulse 99  99





Oximetry   














  06/10/17 06/10/17 06/10/17





  21:28 23:09 23:34


 


Temperature   


 


Pulse Rate  106 H 106 H


 


Respiratory   18





Rate   


 


Blood Pressure   


 


Blood Pressure 211/115 172/106 173/105





[Left]   


 


O2 Sat by Pulse  99 99





Oximetry   














  17





  00:43


 


Temperature 


 


Pulse Rate 107 H


 


Respiratory 18





Rate 


 


Blood Pressure 


 


Blood Pressure 188/107





[Left] 


 


O2 Sat by Pulse 99





Oximetry 














- Reevaluation(s)


Reevaluation #1: 


pt to the er w blister on her foot





bp elevated


pt on 3 meds for bp


she states she usually takes but did not today


pcp recently added the 3rd





pt had no idea until we talked about her dil cmo


she had signed out ama when dx w this. 


discussion w her and her 





clonidine po was given in er waiting room 


bp still elevated here in er


no cp


no sob





here only for her foot


which she had picked dead skin off and now has a large blister/infection





neuro intact


ambulatory 


nad





pmh from [facility]





meds reviewed





labs reviewed





12 lead completed.


Reevaluation #2: 





17 


cardene w mild dec in bp





wound care


blister ruptured while soaking


ancef


tdap





ambulatory and in nad


no cp or sob


Reevaluation #3: 





17 00:16


long discussions this lorenza w the pt and  about htn and heart


they both understand the need to see cards immedatiately


the pt will not allow us to admit her and she will go [facility]; this is bc of her 

work and her 5 kids








Reevaluation #4: 





17 0000


on dc hr 100 bp 180/100


no cp no sob





wound cared for and dressing applied





dc home w follow up arranged





ED Lower Extremity MDM





- Lab Data


Result diagrams: 


 06/10/17 13:33





 06/10/17 13:33





- EKG Data


-: EKG Interpreted by Me


EKG shows normal: sinus rhythm


Rate: tachycardia





- EKG Data


When compared to previous EKG there are: no significant change


Interpretation: no acute changes





- Radiology Data


Radiology results: report reviewed





- Medical Decision Making





many med problems- htn and dil cmo


here w unrelated issue





htn tx


cr n


pt refuses admit 


but will see cards Monday


 will get her there





chest xray neg hf


she has elevated bnp- chronic for her


also elevated trop- again chronic for her





no cp or sob


no edema. 


compensated sys hf at present. 


cr normal





bp trending down





Critical care attestation.: 


If time is entered above; I have spent that time in minutes in the direct care 

of this critically ill patient, excluding procedure time.








ED Disposition


Disposition: DC-01 TO HOME OR SELFCARE


Is pt being admited?: No


Does the pt Need Aspirin: No


Condition: Stable


Instructions:  Heart Failure (ED), Dilated Cardiomyopathy (ED), Cellulitis (ED)

, Hypertension (ED)


Additional Instructions: 


low sodium diet





take your bp meds just as written for every day





antibiotic as given to you this evening





change dressing to your foot twice per day. clean with normal saline and apply 

dry dressing.





SEE PCP ON MONDAY FOR FOOT





AND SEE CARDIOLOGY ON MONDAY FOR HEART AND BLOOD PRESSURE





No alcohol or cig. or drugs








Prescriptions: 


Cephalexin [Keflex] 500 mg PO Q12HR #20 cap


Referrals: 


PRIMARY CARE,MD [Primary Care Provider] - 3-5 Days


BRIDGET MCCORD MD [Staff Physician] - 3-5 Days


Time of Disposition: 00:05





<BIJAN HERNÁNDEZ - Last Filed: 17 12:15>





ED Lower Extremity MDM





- Lab Data


Result diagrams: 


 06/10/17 13:33





 06/10/17 13:33





- Medical Decision Making





Case was discussed with NP in real time with disposition planning prior to the 

end of my shift.  Patient had poorly controlled blood pressure and Cardene drip 

was ordered and recommended admission however, patient refused.  Patient was 

discharged home although AMA was suggested giving poorly controlled blood 

pressure requiring IV medication

## 2017-06-12 NOTE — EVENT NOTE
Date: 06/12/17


Spoke with Pt's grandmother - number here in chart. She was given my number to 

call so that I may follow up with Ms. Hernandez today to be sure she has gotten 

a cardiology and pcp appointment.

## 2020-03-05 NOTE — EMERGENCY DEPARTMENT REPORT
Blank Doc





- Documentation


Documentation: 





39-year-old female that presents with pelvic pain with vaginal bleeding. 





This initial assessment/diagnostic orders/clinical plan/treatment(s) is/are 

subject to change based on patient's health status, clinical progression and re-

assessment by fellow clinical providers in the ED.  Further treatment and workup

at subsequent clinical providers discretion.  Patient/guardians urged not to 

elope from the ED as their condition may be serious if not clinically assessed 

and managed.  Initial orders include:


1- Patient sent to [facility] for further evaluation and treatment


2- UA


3- labs

## 2020-03-06 NOTE — EMERGENCY DEPARTMENT REPORT
ED Abdominal Pain HPI





- General


Chief Complaint: Chest Pain


Stated Complaint: CHEST PAIN


Time Seen by Provider: 20 20:39


Source: patient


Mode of arrival: Ambulatory


Limitations: No Limitations





- History of Present Illness


Initial Comments: 


39-year-old female that presents with pelvic pain with vaginal bleeding x 3 

weeks. she states secondary complaint of chest pain. She has hx of CHF, HTN, 

anemia,DMII ovarian cysts and Uterine Fibroids. Pt has had a tubal ligation, pt 

has GYN doctor but has not seen her in 1 yr. she denies fevers or chills, no 

n/v, no back pain , on sob, no diaphoresis. She denies headache, no dizziness, 

no lightheadedness .  does endorses vaginal bleeding for past 3 weeks using 3-4 

pads daily. Pt denies modifying factors. 





MD Complaint: abdominal pain


Onset/Timing: 3


-: week(s)


Location: LLQ, RLQ, epigastric, suprapubic


Radiation: none


Migration to: no migration


Severity: moderate


Severity scale (0 -10): 4


Quality: cramping, aching


Consistency: intermittent


Improves With: nothing


Worsens With: movement


Associated Symptoms: denies: nausea, vomiting, diarrhea, fever, dysuria, 

hematuria





- Related Data


                                  Previous Rx's











 Medication  Instructions  Recorded  Last Taken  Type


 


Aspirin [Adult Low Dose Aspirin EC] 81 mg PO DAILY #30 tablet. 18 Marshal frey Rx


 


Carvedilol [Coreg] 12.5 mg PO BID #60 tablet 18 Unknown Rx


 


Furosemide [Lasix TAB] 40 mg PO BID #60 tablet 18 Unknown Rx


 


Spironolactone [Aldactone] 25 mg PO QDAY #30 tablet 18 Unknown Rx


 


Valsartan [Diovan] 160 mg PO BID #60 tablet 18 Unknown Rx


 


Acetaminophen/Codeine [Tylenol 1 tab PO Q6H PRN #12 tab 20 Unknown Rx





/Codeine # 3 tab]    


 


medroxyPROGESTERone ACETATE 10 mg PO QDAY 10 Days #10 tablet 20 Unknown Rx





[Provera]    











                                    Allergies











Allergy/AdvReac Type Severity Reaction Status Date / Time


 


cyclobenzaprine HCl Allergy  Shortness Verified 17 12:03





[From Flexeril]   of Breath  


 


ibuprofen [From Motrin] Allergy  Swelling Verified 17 12:03


 


epideral medication Allergy  Swelling Uncoded 17 12:03














ED Review of Systems


ROS: 


Stated complaint: CHEST PAIN


Other details as noted in HPI





Constitutional: denies: chills, fever


Eyes: denies: eye pain, eye discharge, vision change


ENT: denies: ear pain, throat pain


Respiratory: denies: cough, shortness of breath, wheezing


Cardiovascular: denies: chest pain, palpitations


Endocrine: no symptoms reported


Gastrointestinal: abdominal pain (superpubic ).  denies: nausea, vomiting, 

diarrhea


Genitourinary: abnormal menses.  denies: urgency, dysuria, frequency, hematuria,

discharge


Musculoskeletal: denies: back pain, joint swelling, arthralgia


Skin: denies: rash, lesions


Neurological: denies: headache, weakness, paresthesias


Psychiatric: denies: anxiety, depression


Hematological/Lymphatic: denies: easy bleeding, easy bruising





ED Past Medical Hx





- Past Medical History


Hx Hypertension: Yes


Hx CVA: No


Hx Heart Attack/AMI: No


Hx Congestive Heart Failure: Yes


Hx Diabetes: No


Hx Deep Vein Thrombosis: No


Hx Pulmonary Embolism: No


Hx GERD: No


Hx Liver Disease: No


Hx Renal Disease: No


Hx Sickle Cell Disease: No


Hx Arthritis: No


Hx Headaches / Migraines: No


Hx Seizures: No


Hx Kidney Stones: No


Hx Psychiatric Treatment: No


Hx Asthma: No


Hx COPD: No


Hx Tuberculosis: No


Hx Dementia: No


Hx HIV: No





- Surgical History


Hx Coronary Stent: No


Hx Open Heart Surgery: No


Hx Pacemaker: No


Hx Internal Defibrillator: No


Hx Cholecystectomy: Yes


Hx Appendectomy: No


Hx Breast Surgery: No


Additional Surgical History: "Ovarian surgery",.   x 4.  TUBAL LIGATION





- Social History


Smoking Status: Never Smoker


Substance Use Type: None





- Medications


Home Medications: 


                                Home Medications











 Medication  Instructions  Recorded  Confirmed  Last Taken  Type


 


Aspirin [Adult Low Dose Aspirin EC] 81 mg PO DAILY #30 tablet 18  

Unknown Rx


 


Carvedilol [Coreg] 12.5 mg PO BID #60 tablet 18  Unknown Rx


 


Furosemide [Lasix TAB] 40 mg PO BID #60 tablet 18  Unknown Rx


 


Spironolactone [Aldactone] 25 mg PO QDAY #30 tablet 18  Unknown Rx


 


Valsartan [Diovan] 160 mg PO BID #60 tablet 18  Unknown Rx


 


Acetaminophen/Codeine [Tylenol 1 tab PO Q6H PRN #12 tab 20  Unknown Rx





/Codeine # 3 tab]     


 


medroxyPROGESTERone ACETATE 10 mg PO QDAY 10 Days #10 tablet 20  Unknown 

Rx





[Provera]     














ED Physical Exam





- General


Limitations: No Limitations


General appearance: alert, in no apparent distress





- Head


Head exam: Present: atraumatic, normocephalic





- Eye


Eye exam: Present: normal appearance, PERRL, EOMI


Pupils: Present: normal accommodation





- ENT


ENT exam: Present: mucous membranes moist





- Neck


Neck exam: Present: normal inspection, full ROM.  Absent: tenderness, 

lymphadenopathy, thyromegaly





- Expanded Neck Exam


  ** Expanded


Neck exam: Absent: tenderness, anterior neck swelling, thyroid mass, carotid 

bruit





- Respiratory


Respiratory exam: Present: normal lung sounds bilaterally.  Absent: respiratory 

distress, wheezes, rales, rhonchi, stridor, chest wall tenderness





- Cardiovascular


Cardiovascular Exam: Present: regular rate, normal rhythm, normal heart sounds. 

 Absent: systolic murmur, diastolic murmur, rubs, gallop





- GI/Abdominal


GI/Abdominal exam: Present: soft, tenderness (mild superpubic tenderness to deep

 palpation), normal bowel sounds.  Absent: distended, guarding, rebound, rigid, 

bruit, hernia





- Rectal


Rectal exam: Present: deferred





- 


External exam: Present: other (exam deferred by patient. )





- Extremities Exam


Extremities exam: Present: normal inspection, full ROM, normal capillary refill.

  Absent: tenderness, pedal edema





- Back Exam


Back exam: Present: normal inspection, full ROM.  Absent: tenderness, CVA 

tenderness (R), CVA tenderness (L), vertebral tenderness





- Neurological Exam


Neurological exam: Present: alert, oriented X3, CN II-XII intact





- Psychiatric


Psychiatric exam: Present: normal affect





- Skin


Skin exam: Present: warm, dry, intact, normal color.  Absent: rash





ED Course


                                   Vital Signs











  20





  20:37 02:24


 


Temperature 99.4 F 


 


Pulse Rate 94 H 79


 


Respiratory 18 





Rate  


 


Blood Pressure 186/128 192/130


 


O2 Sat by Pulse 100 





Oximetry  














ED Medical Decision Making





- Lab Data


Result diagrams: 


                                 20 20:44





                                 20 01:56





- EKG Data


EKG shows normal: sinus rhythm, axis, intervals, QRS complexes, ST-T waves


Rate: normal





- EKG Data


When compared to previous EKG there are: no significant change


Interpretation: LVH (NSR No ST Elevated MI, LVH no change in EKG, interp by ed 

attending.)





- Radiology Data


Radiology results: report reviewed, image reviewed


Findings


Reporting MD: Calixto Almazan Dictation Time: 2020 00:29 

: Not available Transcription Date:


 


CHEST 2 VIEWS  


 


INDICATION / CLINICAL INFORMATION:  chest pain.  


 


COMPARISON:  2018  


 


FINDINGS:  


 


SUPPORT DEVICES: None.  HEART / MEDIASTINUM: No significant abnormality.  LUNGS 

/ PLEURA: No significant pulmonary or pleural abnormality. No pneumothorax.  


 


ADDITIONAL FINDINGS: No significant additional findings.  


 


IMPRESSION:  1. No acute findings.  


 








- Medical Decision Making


ekg: noted as above, no change from baseline, BNP normal, h/h normal, heart 

score is 0, trop: <0.01, labs noted  as above, pt advises symptoms are improved 

after taking nsaids, plan: Ibuprofen, Provera follow up with GYN in 2-3 days , 

return to emergency if symptoms worsen , pt verbalized agreement and 

understanding of discharge plan, pt dc'd to home in stable condition.  pt states

 she has all rx medications for bp including diuretics at home , advised to take

 upon arrival, there is no headache, dizizness, cp, or sob at this time. 





Critical care attestation.: 


If time is entered above; I have spent that time in minutes in the direct care 

of this critically ill patient, excluding procedure time.








ED Disposition


Clinical Impression: 


 Abnormal uterine bleeding (AUB), History of uterine fibroid, History of ovarian

 cyst





Disposition: DC-01 TO HOME OR SELFCARE


Is pt being admited?: No


Does the pt Need Aspirin: No


Condition: Stable


Instructions:  Ovarian Cyst (ED), Uterine Fibroids (ED), Menorrhagia (ED)


Prescriptions: 


medroxyPROGESTERone ACETATE [Provera] 10 mg PO QDAY 10 Days #10 tablet


Acetaminophen/Codeine [Tylenol /Codeine # 3 tab] 1 tab PO Q6H PRN #12 tab


 PRN Reason: pain


Referrals: 


[facility] [Provider Group] - 3-5 Days


Forms:  Work/School Release Form(ED)


Time of Disposition: 03:27

## 2020-03-06 NOTE — XRAY REPORT
CHEST 2 VIEWS 



INDICATION / CLINICAL INFORMATION:

chest pain. 



COMPARISON: 

1/30/2018



FINDINGS:



SUPPORT DEVICES: None.

HEART / MEDIASTINUM: No significant abnormality. 

LUNGS / PLEURA: No significant pulmonary or pleural abnormality. No pneumothorax. 



ADDITIONAL FINDINGS: No significant additional findings.



IMPRESSION:

1. No acute findings. 



Signer Name: Calixto Almazan MD 

Signed: 3/6/2020 1:29 AM

Workstation Name: [id number]

## 2020-11-20 NOTE — EMERGENCY DEPARTMENT REPORT
ED Shortness of Breath HPI





- General


Chief Complaint: Dyspnea/Respdistress


Stated Complaint: SOB


Time Seen by Provider: 20 18:21


Source: patient, EMS


Mode of arrival: Ambulatory


Limitations: No Limitations





- History of Present Illness


Initial Comments: 





Patient is a 40-year-old F American female with past medical history of 

hypertension and congestive heart failure with a ejection fraction of 15 to 20% 

who is presenting with shortness of breath for approximately a week and a half 

off and on.  Patient states that she is not consistent with her Lasix.  She 

states that she is chases her symptoms.  When she gets symptoms she will take 

double dose of Lasix and try to diurese but then will stop.  She states she 

started last week having some shortness of breath.  For 2 days she took 80 mg of

Lasix instead of her normal 40.  For the last at least 5 days she has not taken 

any additional doses and is now finding herself back short of breath.  Patient 

has contacted Dr. Shaw and she was instructed to come to the hospital.  

Patient does have a dry cough she has dyspnea on exertion and some slight chest 

discomfort.  Patient just took 80 mg of Lasix while in our waiting room.  She 

has not diuresed yet.  Patient denies any fevers chills nausea vomiting diarrhea

at this time.





- Related Data


                                  Previous Rx's











 Medication  Instructions  Recorded  Last Taken  Type


 


Aspirin [Adult Low Dose Aspirin EC] 81 mg PO DAILY #30 tablet. 18 

Unknown Rx


 


Carvedilol [Coreg] 12.5 mg PO BID #60 tablet 18 Unknown Rx


 


Valsartan [Diovan] 160 mg PO BID #60 tablet 18 Unknown Rx


 


Acetaminophen/Codeine [Tylenol 1 tab PO Q6H PRN #12 tab 20 Unknown Rx





/Codeine # 3 tab]    


 


medroxyPROGESTERone ACETATE 10 mg PO QDAY 10 Days #10 tablet 20 Unknown Rx





[Provera]    


 


Furosemide [Lasix TAB] 40 mg PO BID #60 tablet 20 Unknown Rx


 


Spironolactone [Aldactone] 25 mg PO QDAY #30 tablet 20 Unknown Rx











                                    Allergies











Allergy/AdvReac Type Severity Reaction Status Date / Time


 


cyclobenzaprine HCl Allergy  Shortness Verified 17 12:03





[From Flexeril]   of Breath  


 


ibuprofen [From Motrin] Allergy  Swelling Verified 17 12:03


 


EPIDURAL MEDS Allergy  Shortness Uncoded 20 17:10





   of Breath  














ED Review of Systems


ROS: 


Stated complaint: SOB


Other details as noted in HPI





Comment: All other systems reviewed and negative





ED Past Medical Hx





- Past Medical History


Hx Hypertension: Yes


Hx CVA: No


Hx Heart Attack/AMI: No


Hx Congestive Heart Failure: Yes


Hx Diabetes: No


Hx Deep Vein Thrombosis: No


Hx Pulmonary Embolism: No


Hx GERD: No


Hx Liver Disease: No


Hx Renal Disease: No


Hx Sickle Cell Disease: No


Hx Arthritis: No


Hx Headaches / Migraines: No


Hx Seizures: No


Hx Kidney Stones: No


Hx Psychiatric Treatment: No


Hx Asthma: No


Hx COPD: No


Hx Tuberculosis: No


Hx Dementia: No


Hx HIV: No





- Surgical History


Hx Coronary Stent: No


Hx Open Heart Surgery: No


Hx Pacemaker: No


Hx Internal Defibrillator: No


Hx Cholecystectomy: Yes


Hx Appendectomy: No


Hx Breast Surgery: No


Additional Surgical History: "Ovarian surgery",.   x 4.  TUBAL LIGATION





- Social History


Smoking Status: Never Smoker


Substance Use Type: None





- Medications


Home Medications: 


                                Home Medications











 Medication  Instructions  Recorded  Confirmed  Last Taken  Type


 


Aspirin [Adult Low Dose Aspirin EC] 81 mg PO DAILY #30 tablet. 18  

Unknown Rx


 


Carvedilol [Coreg] 12.5 mg PO BID #60 tablet 18  Unknown Rx


 


Valsartan [Diovan] 160 mg PO BID #60 tablet 18  Unknown Rx


 


Acetaminophen/Codeine [Tylenol 1 tab PO Q6H PRN #12 tab 20  Unknown Rx





/Codeine # 3 tab]     


 


medroxyPROGESTERone ACETATE 10 mg PO QDAY 10 Days #10 tablet 20  Unknown 

Rx





[Provera]     


 


Furosemide [Lasix TAB] 40 mg PO BID #60 tablet 20  Unknown Rx


 


Spironolactone [Aldactone] 25 mg PO QDAY #30 tablet 20  Unknown Rx














ED Physical Exam





- General


Limitations: No Limitations


General appearance: alert, in no apparent distress





- Head


Head exam: Present: atraumatic, normocephalic





- Eye


Eye exam: Present: normal appearance, PERRL, EOMI





- ENT


ENT exam: Present: mucous membranes moist





- Neck


Neck exam: Present: normal inspection





- Respiratory


Respiratory exam: Present: normal lung sounds bilaterally.  Absent: respiratory 

distress, wheezes, rales, rhonchi





- Cardiovascular


Cardiovascular Exam: Present: regular rate, normal rhythm, normal heart sounds. 

 Absent: systolic murmur, diastolic murmur, rubs, gallop





- GI/Abdominal


GI/Abdominal exam: Present: soft, normal bowel sounds.  Absent: distended, 

tenderness, guarding, rebound





- Extremities Exam


Extremities exam: Present: normal inspection





- Back Exam


Back exam: Present: normal inspection





- Neurological Exam


Neurological exam: Present: alert, oriented X3





- Psychiatric


Psychiatric exam: Present: normal affect, normal mood





- Skin


Skin exam: Present: warm, dry, intact, normal color.  Absent: rash





ED Course


                                   Vital Signs











  20





  17:12


 


Temperature 97.9 F


 


Pulse Rate 50 L


 


Respiratory 18





Rate 


 


Blood Pressure 168/125


 


O2 Sat by Pulse 100





Oximetry 














- Reevaluation(s)


Reevaluation #1: 





20 22:34


Spoke with the patient about the importance of being consistent with her Lasix. 

 Patient is only taking Lasix after she feels symptoms instead of using it for 

maintenance.  Patient states she started feeling somewhat better last week when 

she doubled her dose for 2 days but has not been on Lasix since.  Patient took 

80 mg of Lasix today but has not started to diurese as of yet.  Patient will be 

given an additional 40 mg of Lasix.  Her delta change on her troponin is not 

greater than 50%.  After the patient diuresis will have a another conversation 

about whether the patient should stay in the hospital as she does not want to 

stay.


Reevaluation #2: 





20 23:36


Patient is diuresed and is gone to the bathroom approximately 3 times.  She 

states that she is now able to walk without dyspnea.  States she feels much 

improved.  Patient would like to get her echocardiogram that was suggested on 

ultrasound and by her cardiologist as an outpatient.  States she does not want 

to stay in the hospital.  Patient is to double her Lasix dose to 80 mg for the 

next 2 days and then continue to take 40 mg daily.  I did stress that the 

patient would need to be more consistent with her medications.





ED Medical Decision Making





- Lab Data


Result diagrams: 


                                 20 18:48





                                 20 18:48








                                   Lab Results











  20 Range/Units





  18:48 18:48 18:48 


 


WBC  7.2    (4.5-11.0)  K/mm3


 


RBC  3.78    (3.65-5.03)  M/mm3


 


Hgb  8.4 L    (10.1-14.3)  gm/dl


 


Hct  27.5 L    (30.3-42.9)  %


 


MCV  73 L    (79-97)  fl


 


MCH  22 L    (28-32)  pg


 


MCHC  31    (30-34)  %


 


RDW  18.8 H    (13.2-15.2)  %


 


Plt Count  249    (140-440)  K/mm3


 


Lymph % (Auto)  40.8 H    (13.4-35.0)  %


 


Mono % (Auto)  6.1    (0.0-7.3)  %


 


Eos % (Auto)  3.2    (0.0-4.3)  %


 


Baso % (Auto)  0.9    (0.0-1.8)  %


 


Lymph # (Auto)  2.9    (1.2-5.4)  K/mm3


 


Mono # (Auto)  0.4    (0.0-0.8)  K/mm3


 


Eos # (Auto)  0.2    (0.0-0.4)  K/mm3


 


Baso # (Auto)  0.1    (0.0-0.1)  K/mm3


 


Seg Neutrophils %  49.0    (40.0-70.0)  %


 


Seg Neutrophils #  3.5    (1.8-7.7)  K/mm3


 


Sodium   135 L   (137-145)  mmol/L


 


Potassium   3.8   (3.6-5.0)  mmol/L


 


Chloride   101.9   ()  mmol/L


 


Carbon Dioxide   22   (22-30)  mmol/L


 


Anion Gap   15   mmol/L


 


BUN   17   (7-17)  mg/dL


 


Creatinine   1.0   (0.6-1.2)  mg/dL


 


Estimated GFR   > 60   ml/min


 


BUN/Creatinine Ratio   17   %


 


Glucose   125 H   ()  mg/dL


 


Calcium   8.5   (8.4-10.2)  mg/dL


 


Total Bilirubin   0.80   (0.1-1.2)  mg/dL


 


AST   105 H   (5-40)  units/L


 


ALT   153 H   (7-56)  units/L


 


Alkaline Phosphatase   183 H   ()  units/L


 


Troponin T   0.029   (0.00-0.029)  ng/mL


 


NT-Pro-B Natriuret Pep   3841 H   (0-450)  pg/mL


 


Total Protein   6.6   (6.3-8.2)  g/dL


 


Albumin   3.2 L   (3.9-5)  g/dL


 


Albumin/Globulin Ratio   0.9   %


 


HCG, Qual    Negative  (Negative)  














  20 Range/Units





  21:47 


 


WBC   (4.5-11.0)  K/mm3


 


RBC   (3.65-5.03)  M/mm3


 


Hgb   (10.1-14.3)  gm/dl


 


Hct   (30.3-42.9)  %


 


MCV   (79-97)  fl


 


MCH   (28-32)  pg


 


MCHC   (30-34)  %


 


RDW   (13.2-15.2)  %


 


Plt Count   (140-440)  K/mm3


 


Lymph % (Auto)   (13.4-35.0)  %


 


Mono % (Auto)   (0.0-7.3)  %


 


Eos % (Auto)   (0.0-4.3)  %


 


Baso % (Auto)   (0.0-1.8)  %


 


Lymph # (Auto)   (1.2-5.4)  K/mm3


 


Mono # (Auto)   (0.0-0.8)  K/mm3


 


Eos # (Auto)   (0.0-0.4)  K/mm3


 


Baso # (Auto)   (0.0-0.1)  K/mm3


 


Seg Neutrophils %   (40.0-70.0)  %


 


Seg Neutrophils #   (1.8-7.7)  K/mm3


 


Sodium   (137-145)  mmol/L


 


Potassium   (3.6-5.0)  mmol/L


 


Chloride   ()  mmol/L


 


Carbon Dioxide   (22-30)  mmol/L


 


Anion Gap   mmol/L


 


BUN   (7-17)  mg/dL


 


Creatinine   (0.6-1.2)  mg/dL


 


Estimated GFR   ml/min


 


BUN/Creatinine Ratio   %


 


Glucose   ()  mg/dL


 


Calcium   (8.4-10.2)  mg/dL


 


Total Bilirubin   (0.1-1.2)  mg/dL


 


AST   (5-40)  units/L


 


ALT   (7-56)  units/L


 


Alkaline Phosphatase   ()  units/L


 


Troponin T  0.032 H  (0.00-0.029)  ng/mL


 


NT-Pro-B Natriuret Pep   (0-450)  pg/mL


 


Total Protein   (6.3-8.2)  g/dL


 


Albumin   (3.9-5)  g/dL


 


Albumin/Globulin Ratio   %


 


HCG, Qual   (Negative)  














- EKG Data


-: EKG Interpreted by Me





- EKG Data





20 22:33


EKG shows sinus rhythm rate of 93.  Axis is normal intervals are normal.  There 

is LVH present.  T waves are inverted in 1 and aVL.  There is no ST segment 

elevations or depressions.





- Radiology Data








 CHEST 2 VIEWS 





INDICATION / CLINICAL INFORMATION: 


Chest Pain. 





COMPARISON: 


3/6/2020 





FINDINGS: 





SUPPORT DEVICES: None. 


HEART / MEDIASTINUM: Cardiac silhouette is moderately enlarged, new since prior 

study with normal 


 pulmonary vascularity 


LUNGS / PLEURA: No significant pulmonary or pleural abnormality. No 

pneumothorax. 





ADDITIONAL FINDINGS: No significant additional findings. 





IMPRESSION: 


1. New onset enlarged cardiac silhouette with globular configuration may be 

secondary to 


 pericardial effusion. Echocardiogram recommended. 





Signer Name: Ata Leal MD 


Signed: 2020 8:10 PM 


Workstation Name: [id number] 


Critical care attestation.: 


If time is entered above; I have spent that time in minutes in the direct care 

of this critically ill patient, excluding procedure time.








ED Disposition


Clinical Impression: 


 CHF exacerbation, Medical non-compliance, Atypical chest pain





Disposition: DC-01 TO HOME OR SELFCARE


Is pt being admited?: No


Does the pt Need Aspirin: No


Condition: Stable


Instructions:  Chest Pain (ED), Nonspecific Chest Pain, Adult, Heart Failure, 

Self Care, Easy-to-Read


Additional Instructions: 


Double your Lasix dose to 80 mg per dose for the next 2 days and then try to 

stay consistent with taking 40 mg per dose EVERYDAY!!! (That's tough LOVE)


Good [place], You can do it!





Please reschedule your echocardiogram with Dr. Lyles


Prescriptions: 


Spironolactone [Aldactone] 25 mg PO QDAY #30 tablet


Furosemide [Lasix TAB] 40 mg PO BID #60 tablet


Referrals: 


ROSENDA LYLES MD [Staff Physician] - 3-5 Days


Time of Disposition: 23:41

## 2020-11-20 NOTE — EVENT NOTE
ED Screening Note


ED Screening Note: 





SOB a week ago 


chest pain described as a pressure 


states she has some swelling in her legs 


no fever 


+cough


no n/v/d


PMHx CHF, HTN


allergy: flexeril, ibuprofen, epidural 


non smoker


non ETOH 


no drug use


cardiologist: Dr. Lyles





This initial assessment/diagnostic orders/clinical plan/treatment(s) is/are 

subject to change based on patients health status, clinical progression and re-

assessment by fellow clinical providers in the ED. Further treatment and workup 

at subsequent clinical providers discretion. Patient/guardian urged not to elope

from the ED as their condition may be serious if not clinically assessed and 

managed. 





Initial orders include: 


cp protocol

## 2020-11-24 NOTE — EMERGENCY DEPARTMENT REPORT
ED Shortness of Breath HPI





- General


Chief Complaint: Dyspnea/Respdistress


Stated Complaint: CONSIPATION, DIFF BREATHING


Time Seen by Provider: 20 14:51


Source: patient


Mode of arrival: Ambulatory


Limitations: No Limitations





- History of Present Illness


Initial Comments: 





Chief complaint: "I cannot breathe.  I am not [urinating]."





HPI: This is a 40-year-old female with history of systolic CHF EF 15%, 

hypertension who presents with shortness of breath for the past week.  She is 

taking 80 mg of furosemide per day.  She noticed that she is not urinating.  She

 has swelling lower extremities.





Patient was evaluated in the emergency department 4 days ago.  She received IV 

Lasix.  She declined hospitalization at that time.  She denies chest pain.  She 

denies fever.  She denies cough.


MD Complaint: shortness of breath


-: Gradual, week(s) (1 week)


Severity: severe


Consistency: constant


Improves With: nothing


Worsens With: exertion


Known History Of: congestive heart failure


Context: recent illness


Associated Symptoms: denies other symptoms





- Related Data


                                  Previous Rx's











 Medication  Instructions  Recorded  Last Taken  Type


 


Aspirin [Adult Low Dose Aspirin EC] 81 mg PO DAILY #30 tablet. 18 

Unknown Rx


 


Carvedilol [Coreg] 12.5 mg PO BID #60 tablet 18 Unknown Rx


 


Valsartan [Diovan] 160 mg PO BID #60 tablet 18 Unknown Rx


 


Acetaminophen/Codeine [Tylenol 1 tab PO Q6H PRN #12 tab 20 Unknown Rx





/Codeine # 3 tab]    


 


medroxyPROGESTERone ACETATE 10 mg PO QDAY 10 Days #10 tablet 20 Unknown Rx





[Provera]    


 


Furosemide [Lasix TAB] 40 mg PO BID #60 tablet 20 Unknown Rx


 


Spironolactone [Aldactone] 25 mg PO QDAY #30 tablet 20 Unknown Rx











                                    Allergies











Allergy/AdvReac Type Severity Reaction Status Date / Time


 


cyclobenzaprine HCl Allergy  Shortness Verified 17 12:03





[From Flexeril]   of Breath  


 


ibuprofen [From Motrin] Allergy  Swelling Verified 17 12:03


 


EPIDURAL MEDS Allergy  Shortness Uncoded 20 17:10





   of Breath  














ED Review of Systems


ROS: 


Stated complaint: CONSIPATION, DIFF BREATHING


Other details as noted in HPI





Comment: All other systems reviewed and negative


Constitutional: denies: fever, malaise


Respiratory: shortness of breath.  denies: cough, wheezing


Cardiovascular: denies: chest pain, palpitations


Gastrointestinal: denies: abdominal pain, nausea, vomiting





ED Past Medical Hx





- Past Medical History


Previous Medical History?: Yes


Hx Hypertension: Yes


Hx CVA: No


Hx Heart Attack/AMI: No


Hx Congestive Heart Failure: Yes


Hx Diabetes: No


Hx Deep Vein Thrombosis: No


Hx Pulmonary Embolism: No


Hx GERD: No


Hx Liver Disease: No


Hx Renal Disease: No


Hx Sickle Cell Disease: No


Hx Arthritis: No


Hx Headaches / Migraines: No


Hx Seizures: No


Hx Kidney Stones: No


Hx Psychiatric Treatment: No


Hx Asthma: No


Hx COPD: No


Hx Tuberculosis: No


Hx Dementia: No


Hx HIV: No





- Surgical History


Past Surgical History?: Yes


Hx Coronary Stent: No


Hx Open Heart Surgery: No


Hx Pacemaker: No


Hx Internal Defibrillator: No


Hx Cholecystectomy: Yes


Hx Appendectomy: No


Hx Breast Surgery: No


Additional Surgical History: "Ovarian surgery",.   x 4.  TUBAL LIGATION





- Social History


Smoking Status: Never Smoker


Substance Use Type: None





- Medications


Home Medications: 


                                Home Medications











 Medication  Instructions  Recorded  Confirmed  Last Taken  Type


 


Aspirin [Adult Low Dose Aspirin EC] 81 mg PO DAILY #30 tablet. 18  

Unknown Rx


 


Carvedilol [Coreg] 12.5 mg PO BID #60 tablet 18  Unknown Rx


 


Valsartan [Diovan] 160 mg PO BID #60 tablet 18  Unknown Rx


 


Acetaminophen/Codeine [Tylenol 1 tab PO Q6H PRN #12 tab 20  Unknown Rx





/Codeine # 3 tab]     


 


medroxyPROGESTERone ACETATE 10 mg PO QDAY 10 Days #10 tablet 20  Unknown 

Rx





[Provera]     


 


Furosemide [Lasix TAB] 40 mg PO BID #60 tablet 20  Unknown Rx


 


Spironolactone [Aldactone] 25 mg PO QDAY #30 tablet 20  Unknown Rx














ED Physical Exam





- General


Limitations: No Limitations


General appearance: alert, in no apparent distress, other (Mild work of 

breathing after ambulating to treatment room)





- Head


Head exam: Present: atraumatic, normocephalic





- Eye


Eye exam: Present: normal appearance





- ENT


ENT exam: Present: mucous membranes moist





- Neck


Neck exam: Present: normal inspection, full ROM





- Respiratory


Respiratory exam: Present: decreased breath sounds.  Absent: wheezes, rales, 

rhonchi, prolonged expiratory





- Cardiovascular


Cardiovascular Exam: Present: normal rhythm, tachycardia, normal heart sounds.  

Absent: systolic murmur, diastolic murmur, rubs, gallop





- GI/Abdominal


GI/Abdominal exam: Present: soft, normal bowel sounds.  Absent: distended, 

tenderness, guarding, rebound





- Extremities Exam


Extremities exam: Present: pedal edema





- Neurological Exam


Neurological exam: Present: alert, oriented X3





- Psychiatric


Psychiatric exam: Present: normal affect, normal mood





- Skin


Skin exam: Present: warm, dry, intact, normal color.  Absent: rash





ED Course


                                   Vital Signs











  20





  14:44 15:35 15:41


 


Temperature 98.5 F  


 


Pulse Rate 115 H 110 H 108 H


 


Respiratory 26 H  





Rate   


 


Blood Pressure  203/142 203/142


 


Blood Pressure 196/138  





[Right]   


 


O2 Sat by Pulse 98  





Oximetry   














  20





  15:43


 


Temperature 


 


Pulse Rate 108 H


 


Respiratory 





Rate 


 


Blood Pressure 203/142


 


Blood Pressure 





[Right] 


 


O2 Sat by Pulse 





Oximetry 














ED Medical Decision Making





- Lab Data


Result diagrams: 


                                 20 15:14





                                 20 15:14





- EKG Data


-: EKG Interpreted by Me


EKG shows normal: sinus rhythm, axis


Rate: tachycardia





- EKG Data


Interpretation: LVH





20 15:03


EKG obtained 1453


EKG interpreted by me


Sinus tachycardia rate 115 bpm normal axis prolonged QTC no ST elevation 

positive LVH nonspecific T wave pattern





- Radiology Data


Radiology results: report reviewed





CHEST 1 VIEW 2020 3:49 PM 





INDICATION / CLINICAL INFORMATION: dyspnea, hx of CHF. 





COMPARISON: 2020 





FINDINGS: 





SUPPORT DEVICES: None. 





HEART / MEDIASTINUM: Stable. Moderate cardiomegaly. 





LUNGS / PLEURA: Mild pulmonary vascular congestion. No significant effusion. No 

pneumothorax. 





ADDITIONAL FINDINGS: No significant additional findings. 





IMPRESSION: 


1. Stable moderate cardiomegaly with mild pulmonary vascular congestion. 





- Medical Decision Making





1.  Acute CHF exacerbation: Patient had severe work of breathing with 

ambulation.  On reexamination patient has work of breathing at rest.  Patient 

treated with nitroglycerin paste, blood pressure control, diuresis.





2.  Hypertensive urgency: Blood pressure improved treatment provided emergency 

department.





Patient admitted to the hospital service.


Critical care attestation.: 


If time is entered above; I have spent that time in minutes in the direct care 

of this critically ill patient, excluding procedure time.








ED Disposition


Clinical Impression: 


 Acute systolic heart failure





Disposition:  OP ADMIT IP TO THIS HOSP


Is pt being admited?: Yes


Does the pt Need Aspirin: No


Condition: Fair

## 2020-11-24 NOTE — XRAY REPORT
CHEST 1 VIEW 11/24/2020 3:49 PM



INDICATION / CLINICAL INFORMATION: dyspnea, hx of CHF.



COMPARISON: 11/20/2020



FINDINGS:



SUPPORT DEVICES: None.



HEART / MEDIASTINUM: Stable. Moderate cardiomegaly. 



LUNGS / PLEURA: Mild pulmonary vascular congestion. No significant effusion. No pneumothorax. 



ADDITIONAL FINDINGS: No significant additional findings.



IMPRESSION:

1. Stable moderate cardiomegaly with mild pulmonary vascular congestion.



Signer Name: Peng Mcmullen MD 

Signed: 11/24/2020 4:50 PM

Workstation Name: [id number]

## 2020-11-24 NOTE — HISTORY AND PHYSICAL REPORT
History of Present Illness


Chief complaint: 





I cannot breathe


History of present illness: 


41 YO Female with Systolic CHF EF 15%, Nicotine Dependence, Noncompliance, HTN, 

Obesity Hypoventilation Syndrome presents to ED for evaluation. Pt states that 

she has experienced shortness of breath for the past 1 week with worsening 

symptoms over the past 2 days. Pt states that symptoms have gotten progressively

worse. Pt  acknowledges Orthopnea, PND, decreased exercise tolerance, leg edema,

dyspnea on exertion, dyspnea at rest.  Patient transported to [facility] via private 

vehicle for further care and evaluation of the aforementioned symptoms.  Patient

seen and evaluated in the emergency department.  All lab and imaging studies 

reviewed.  Patient found to have clinical symptoms consistent with CHF 

decompensation, as well as malignant hypertension.  Patient admitted to 

telemetry and initiated on CHF protocol due to increased risk of cardiopulmonary

decompensation.  Pt denies fever, chills, chest pain, palpitations, syncope, 

prolonged travel/immobility, individual/family history of DVT/PE, vision 

changes, BRBPR, productive cough, productive cough, or known recent ill 

contacts, or known exposure to COVID-19.  Prior admission on 2018 reviewed.

 All medication listed at time of admission has been reconciled.








Past History


Past Surgical History: cholecystectomy, , Other (Tubal ligation)


Social history: single.  denies: smoking, alcohol abuse


Family history: diabetes, hypertension





Medications and Allergies


                                    Allergies











Allergy/AdvReac Type Severity Reaction Status Date / Time


 


cyclobenzaprine HCl Allergy  Shortness Verified 17 12:03





[From Flexeril]   of Breath  


 


ibuprofen [From Motrin] Allergy  Swelling Verified 17 12:03


 


EPIDURAL MEDS Allergy  Shortness Uncoded 20 17:10





   of Breath  











                                Home Medications











 Medication  Instructions  Recorded  Confirmed  Last Taken  Type


 


Aspirin [Adult Low Dose Aspirin EC] 81 mg PO DAILY #30 tablet. 18  

Unknown Rx


 


Carvedilol [Coreg] 12.5 mg PO BID #60 tablet 18  Unknown Rx


 


Valsartan [Diovan] 160 mg PO BID #60 tablet 18  Unknown Rx


 


Acetaminophen/Codeine [Tylenol 1 tab PO Q6H PRN #12 tab 20  Unknown Rx





/Codeine # 3 tab]     


 


medroxyPROGESTERone ACETATE 10 mg PO QDAY 10 Days #10 tablet 20  Unknown 

Rx





[Provera]     


 


Furosemide [Lasix TAB] 40 mg PO BID #60 tablet 20  Unknown Rx


 


Spironolactone [Aldactone] 25 mg PO QDAY #30 tablet 20  Unknown Rx














Review of Systems


Constitutional: no weight loss, no weight gain, no fever, no chills


Ears, nose, mouth and throat: no ear pain, no tinnitis, no decreased hearing, no

 nose pain, no nasal congestion, no nasal discharge


Breasts: no change in shape, no swelling, no mass


Cardiovascular: orthopnea, shortness of breath, dyspnea on exertion, paroxysmal 

nocturnal dyspnea, high blood pressure, leg edema, decreased exercise tolerance,

 no chest pain, no rapid/irregular heart beat, no syncope


Respiratory: no cough, no cough with sputum, no excessive sputum, no hemoptysis


Gastrointestinal: no nausea, no vomiting, no diarrhea, no constipation


Genitourinary Female: no pelvic pain, no flank pain, no dysuria, no urinary 

frequency, no urgency


Rectal: no pain, no incontinence, no bleeding


Musculoskeletal: no neck pain, no shooting arm pain, no arm numbness/tingling, 

no low back pain, no shooting leg pain


Integumentary: no rash, no pruritis, no redness, no sores, no wounds


Neurological: no transient paralysis, no paralysis, no weakness, no parathesias,

 no numbness, no seizures


Psychiatric: no anxiety, no memory loss, no change in sleep habits, no sleep dis

turbances, no insomnia, no hypersomnia, no change in appetite


Endocrine: no cold intolerance, no heat intolerance, no polyphagia, no excessive

 thirst, no polydipsia, no polyuria


Hematologic/Lymphatic: no easy bruising, no easy bleeding


Allergic/Immunologic: no urticaria, no allergic rhinitis, no wheezing





Exam





- Constitutional


Vitals: 


                                        











Temp Pulse Resp BP Pulse Ox


 


 98.5 F   108 H  26 H  203/142   98 


 


 20 14:44  20 15:43  20 14:44  20 15:43  20 14:44











General appearance: Present: mild distress





- EENT


Eyes: Present: PERRL


ENT: hearing intact, clear oral mucosa





- Neck


Neck: Present: supple, normal ROM, masses or JVD





- Respiratory


Respiratory effort: labored


Respiratory: bilateral: diminished, rales





- Cardiovascular


Heart Sounds: Present: S1 & S2.  Absent: rub, click





- Extremities


Extremity abnormal: edema


Peripheral Pulses: within normal limits





- Abdominal


General gastrointestinal: Present: soft, non-tender, non-distended, normal bowel

 sounds


Female genitourinary: Present: normal





- Integumentary


Integumentary: Present: clear, warm, dry





- Musculoskeletal


Musculoskeletal: gait normal, strength equal bilaterally





- Psychiatric


Psychiatric: appropriate mood/affect, intact judgment & insight





- Neurologic


Neurologic: CNII-XII intact, moves all extremities





HEART Score





- HEART Score


Troponin: 


                                        











Troponin T  0.047 ng/mL (0.00-0.029)  H D 20  15:14    














Results





- Labs


CBC & Chem 7: 


                                 20 15:14





                                 20 15:14


Labs: 


                              Abnormal lab results











  20 Range/Units





  15:14 15:14 15:14 


 


Hgb  9.0 L    (10.1-14.3)  gm/dl


 


Hct  29.1 L    (30.3-42.9)  %


 


MCV  71 L    (79-97)  fl


 


MCH  22 L    (28-32)  pg


 


RDW  18.7 H    (13.2-15.2)  %


 


Sodium   133 L   (137-145)  mmol/L


 


Glucose   106 H   ()  mg/dL


 


ALT   88 H   (7-56)  units/L


 


Alkaline Phosphatase   141 H   ()  units/L


 


Troponin T   0.047 H D   (0.00-0.029)  ng/mL


 


NT-Pro-B Natriuret Pep    6958 H  (0-450)  pg/mL


 


Albumin   3.0 L   (3.9-5)  g/dL














Assessment and Plan





- Patient Problems


(1) Acute systolic heart failure


Current Visit: Yes   Status: Acute   


Plan to address problem: 


Strict I's/O, monitor urine output every shift, daily weight, afterload 

reduction, prior echocardiogram reviewed, cardiology team consulted in ED, BNP, 

thyroid panel, magnesium level, echocardiogram ordered and is pending at time of

 admission.  Diuresis,








(2) Malignant hypertension


Current Visit: Yes   Status: Acute   


Plan to address problem: 


Monitor blood pressure every shift, continue medical management, hydralazine IV 

every 6 hours as needed for systolic blood pressure greater than or equal to 155

 mmHg








(3) Obesity hypoventilation syndrome


Current Visit: Yes   Status: Acute   


Plan to address problem: 


Supplemental oxygen, nebulizer therapy, pulse oximetry, noninvasive positive 

pressure ventilation as clinically indicated, balanced diet, increase physical 

activity at discharge, outpatient pulmonology consult for sleep study.








(4) Hyponatremia


Current Visit: Yes   Status: Acute   


Plan to address problem: 


BMP, repeat BMP in a.m., IV fluid resuscitation therapy as clinically indicated.








(5) DVT prophylaxis


Current Visit: Yes   Status: Acute   


Plan to address problem: 


SCD to bilateral lower extremities while in bed, patient is ambulatory

## 2020-11-25 NOTE — CONSULTATION
History of Present Illness


Consult date: 20


Consult reason: congestive heart failure


History of present illness: 





The patient is a 40-year-old woman with a history of dilated nonischemic 

cardiomyopathy on medical therapy.  In 2017, a cardiac catheterization 

established normal coronary arteries, with left ventricular ejection fraction 

20%.  She states that she has been compliant with her medical therapy, and 

cardiologist office visits.  She is unable to recall if any conversations have 

been had with regards to primary ICD implantation.





She presents to the hospital at this time with shortness of breath.  There was 

also some lower extremity edema.  She describes some orthopnea.  She states that

she has been compliant with a salt restricted diet.  She was seen in the em

ergency room and referred for admission.





EKG is sinus tachycardia at 114, occasional PVC, left ventricular hypertrophy.  

Chest x-ray shows a severe cardiomegaly, but minimal to no significant interst

itial edema or heart failure decompensation.





Past History


Past Medical History: heart failure, hypertension


Past Surgical History: cholecystectomy, , Other (Tubal ligation)


Social history: single.  denies: smoking, alcohol abuse


Family history: diabetes, hypertension





Medications and Allergies


                                    Allergies











Allergy/AdvReac Type Severity Reaction Status Date / Time


 


cyclobenzaprine HCl Allergy  Shortness Verified 17 12:03





[From Flexeril]   of Breath  


 


ibuprofen [From Motrin] Allergy  Swelling Verified 17 12:03


 


EPIDURAL MEDS Allergy  Shortness Uncoded 20 17:10





   of Breath  











                                Home Medications











 Medication  Instructions  Recorded  Confirmed  Last Taken  Type


 


Aspirin [Adult Low Dose Aspirin EC] 81 mg PO DAILY #30 tablet. 18  

Unknown Rx


 


Carvedilol [Coreg] 12.5 mg PO BID #60 tablet 18  Unknown Rx


 


Valsartan [Diovan] 160 mg PO BID #60 tablet 18  Unknown Rx


 


Acetaminophen/Codeine [Tylenol 1 tab PO Q6H PRN #12 tab 20  Unknown Rx





/Codeine # 3 tab]     


 


medroxyPROGESTERone ACETATE 10 mg PO QDAY 10 Days #10 tablet 20  Unknown 

Rx





[Provera]     


 


Furosemide [Lasix TAB] 40 mg PO BID #60 tablet 20  Unknown Rx


 


Spironolactone [Aldactone] 25 mg PO QDAY #30 tablet 20  Unknown Rx











Active Meds: 


Active Medications





Acetaminophen (Tylenol)  650 mg PO Q4H PRN


   PRN Reason: Pain MILD(1-3)/Fever >100.5/HA


Acetaminophen/Codeine Phosphate (Tylenol #3)  1 tab PO Q6H PRN


   PRN Reason: Pain, Moderate (4-6)


   Last Admin: 20 05:38 Dose:  1 tab


   Documented by: 


Aspirin (Halfprin Ec)  81 mg PO DAILY [facility]


   Last Admin: 20 10:05 Dose:  81 mg


   Documented by: 


Carvedilol (Coreg)  12.5 mg PO BID [facility]


   Last Admin: 20 10:05 Dose:  12.5 mg


   Documented by: 


Furosemide (Lasix)  20 mg IV BID@0600,1800 [facility]


   Last Admin: 20 05:14 Dose:  20 mg


   Documented by: 


Hydralazine HCl (Apresoline)  10 mg IV Q6HR PRN


   PRN Reason: Hypertension


   Last Admin: 20 03:28 Dose:  10 mg


   Documented by: 


Ondansetron HCl (Zofran)  4 mg IV Q8H PRN


   PRN Reason: Nausea And Vomiting


Sodium Chloride (Sodium Chloride Flush Syringe 10 Ml)  10 ml IV BID [facility]


   Last Admin: 20 10:06 Dose:  10 ml


   Documented by: 


Sodium Chloride (Sodium Chloride Flush Syringe 10 Ml)  10 ml IV PRN PRN


   PRN Reason: LINE FLUSH


Spironolactone (Aldactone)  25 mg PO QDAY [facility]


   Last Admin: 20 10:06 Dose:  25 mg


   Documented by: 


Valsartan (Diovan)  160 mg PO BID [facility]


   Last Admin: 20 10:05 Dose:  160 mg


   Documented by: 











Review of Systems


Cardiovascular: orthopnea, edema, shortness of breath, no chest pain, no 

palpitations, no rapid/irregular heart beat, no syncope, no lightheadedness





Physical Examination


                                   Vital Signs











Temp Pulse Resp BP Pulse Ox


 


 98.5 F   115 H  26 H  196/138   98 


 


 20 14:44  20 14:44  20 14:44  20 14:44  20 14:44











General appearance: no acute distress


HEENT: Positive: PERRL


Neck: Positive: neck supple


Cardiac: Positive: Reg Rate and Rhythm


Lungs: Positive: Decreased Breath Sounds


Neuro: Positive: Grossly Intact


Abdomen: Positive: Soft


Female genitourinary: deferred


Skin: Positive: Clear


Extremities: Present: +1 Edema





Results





                                 20 15:14





                                 20 05:13


                                 Cardiac Enzymes











  20 Range/Units





  15:14 


 


AST  34  (5-40)  units/L








                                       CBC











  20 Range/Units





  15:14 


 


WBC  7.2  (4.5-11.0)  K/mm3


 


RBC  4.08  (3.65-5.03)  M/mm3


 


Hgb  9.0 L  (10.1-14.3)  gm/dl


 


Hct  29.1 L  (30.3-42.9)  %


 


Plt Count  239  (140-440)  K/mm3


 


Lymph # (Auto)  2.3  (1.2-5.4)  K/mm3


 


Mono # (Auto)  0.4  (0.0-0.8)  K/mm3


 


Eos # (Auto)  0.2  (0.0-0.4)  K/mm3


 


Baso # (Auto)  0.1  (0.0-0.1)  K/mm3








                          Comprehensive Metabolic Panel











  20 Range/Units





  15:14 05:13 


 


Sodium  133 L  138  (137-145)  mmol/L


 


Potassium  3.8  3.7  (3.6-5.0)  mmol/L


 


Chloride  98.2  TNR  ()  mmol/L


 


Carbon Dioxide  28  29  (22-30)  mmol/L


 


BUN  15  15  (7-17)  mg/dL


 


Creatinine  1.1  1.2  (0.6-1.2)  mg/dL


 


Glucose  106 H  112 H  ()  mg/dL


 


Calcium  8.9  8.6  (8.4-10.2)  mg/dL


 


AST  34   (5-40)  units/L


 


ALT  88 H   (7-56)  units/L


 


Alkaline Phosphatase  141 H   ()  units/L


 


Total Protein  6.6   (6.3-8.2)  g/dL


 


Albumin  3.0 L   (3.9-5)  g/dL














EKG interpretations





- Telemetry


EKG Rhythm: Sinus Tachycardia





Assessment and Plan





- Patient Problems


(1) Acute on chronic systolic heart failure


Current Visit: Yes   Status: Acute   


Plan to address problem: 


We will treat the patient with intravenous diuretics, trial of intravenous 

milrinone therapy, continue oral guideline directed medical therapy.

## 2020-11-25 NOTE — PROGRESS NOTE
Assessment and Plan





- Patient Problems


(1) Acute systolic heart failure


Current Visit: Yes   Status: Acute   


Plan to address problem: 


Strict I's/O, monitor urine output every shift, daily weight, afterload 

reduction, prior echocardiogram reviewed, cardiology team consulted, continue 

diuresis, increase blood pressure control, supportive care.








(2) Malignant hypertension


Current Visit: Yes   Status: Acute   


Plan to address problem: 


Monitor blood pressure every shift, continue medical management, hydralazine IV 

every 6 hours as needed for systolic blood pressure greater than or equal to 155

mmHg








(3) Obesity hypoventilation syndrome


Current Visit: Yes   Status: Acute   


Plan to address problem: 


Supplemental oxygen, nebulizer therapy, pulse oximetry, noninvasive positive 

pressure ventilation as clinically indicated, balanced diet, increase physical 

activity at discharge, outpatient pulmonology consult for sleep study.








(4) Hyponatremia


Current Visit: Yes   Status: Acute   


Plan to address problem: 


BMP, repeat BMP in a.m., IV fluid resuscitation therapy as clinically indicated.








(5) DVT prophylaxis


Current Visit: Yes   Status: Acute   


Plan to address problem: 


SCD to bilateral lower extremities while in bed, patient is ambulatory








History


Interval history: 


39 YO Female HD #2 with CHF Decompensation, obesity hypoventilation syndrome, 

accelerated hypertension secondary to medication noncompliance.  Patient resting

comfortably.  Patient states that she feels better today.  Patient denies pain. 

No reported nursing events.








Hospitalist Physical





- Constitutional


Vitals: 


                                        











Temp Pulse Resp BP Pulse Ox


 


 98.6 F   51 L  18   123/79   99 


 


 11/25/20 07:18  11/25/20 07:18  11/25/20 07:18  11/25/20 07:18  11/25/20 07:18











General appearance: Present: mild distress





- EENT


Eyes: Present: PERRL


ENT: hearing intact





- Respiratory


Respiratory: bilateral: CTA





- Cardiovascular


Rhythm: regular


Heart Sounds: Present: gallop





- Extremities


Extremities: no ischemia


Extremity abnormal: edema


Peripheral Pulses: within normal limits





- Abdominal


General gastrointestinal: soft, non-tender, non-distended





- Integumentary


Integumentary: Present: clear, dry





- Psychiatric


Psychiatric: appropriate mood/affect, cooperative





- Neurologic


Neurologic: CNII-XII intact





HEART Score





- HEART Score


Troponin: 


                                        











Troponin T  0.047 ng/mL (0.00-0.029)  H D 11/24/20  15:14    














Results





- Labs


CBC & Chem 7: 


                                 11/24/20 15:14





                                 11/25/20 05:13


Labs: 


                             Laboratory Last Values











WBC  7.2 K/mm3 (4.5-11.0)   11/24/20  15:14    


 


RBC  4.08 M/mm3 (3.65-5.03)   11/24/20  15:14    


 


Hgb  9.0 gm/dl (10.1-14.3)  L  11/24/20  15:14    


 


Hct  29.1 % (30.3-42.9)  L  11/24/20  15:14    


 


MCV  71 fl (79-97)  L  11/24/20  15:14    


 


MCH  22 pg (28-32)  L  11/24/20  15:14    


 


MCHC  31 % (30-34)   11/24/20  15:14    


 


RDW  18.7 % (13.2-15.2)  H  11/24/20  15:14    


 


Plt Count  239 K/mm3 (140-440)   11/24/20  15:14    


 


Lymph % (Auto)  31.7 % (13.4-35.0)   11/24/20  15:14    


 


Mono % (Auto)  5.4 % (0.0-7.3)   11/24/20  15:14    


 


Eos % (Auto)  2.1 % (0.0-4.3)   11/24/20  15:14    


 


Baso % (Auto)  1.1 % (0.0-1.8)   11/24/20  15:14    


 


Lymph # (Auto)  2.3 K/mm3 (1.2-5.4)   11/24/20  15:14    


 


Mono # (Auto)  0.4 K/mm3 (0.0-0.8)   11/24/20  15:14    


 


Eos # (Auto)  0.2 K/mm3 (0.0-0.4)   11/24/20  15:14    


 


Baso # (Auto)  0.1 K/mm3 (0.0-0.1)   11/24/20  15:14    


 


Seg Neutrophils %  59.7 % (40.0-70.0)   11/24/20  15:14    


 


Seg Neutrophils #  4.3 K/mm3 (1.8-7.7)   11/24/20  15:14    


 


Sodium  138 mmol/L (137-145)   11/25/20  05:13    


 


Potassium  3.7 mmol/L (3.6-5.0)   11/25/20  05:13    


 


Chloride  TNR   11/25/20  05:13    


 


Carbon Dioxide  29 mmol/L (22-30)   11/25/20  05:13    


 


Anion Gap  11 mmol/L  11/25/20  05:13    


 


BUN  15 mg/dL (7-17)   11/25/20  05:13    


 


Creatinine  1.2 mg/dL (0.6-1.2)   11/25/20  05:13    


 


Estimated GFR  60 ml/min  11/25/20  05:13    


 


BUN/Creatinine Ratio  13 %  11/25/20  05:13    


 


Glucose  112 mg/dL ()  H  11/25/20  05:13    


 


Calcium  8.6 mg/dL (8.4-10.2)   11/25/20  05:13    


 


Magnesium  1.80 mg/dL (1.7-2.3)   11/24/20  17:38    


 


Total Bilirubin  0.90 mg/dL (0.1-1.2)   11/24/20  15:14    


 


AST  34 units/L (5-40)   11/24/20  15:14    


 


ALT  88 units/L (7-56)  H  11/24/20  15:14    


 


Alkaline Phosphatase  141 units/L ()  H  11/24/20  15:14    


 


Troponin T  0.047 ng/mL (0.00-0.029)  H D 11/24/20  15:14    


 


NT-Pro-B Natriuret Pep  6958 pg/mL (0-450)  H  11/24/20  15:14    


 


Total Protein  6.6 g/dL (6.3-8.2)   11/24/20  15:14    


 


Albumin  3.0 g/dL (3.9-5)  L  11/24/20  15:14    


 


Albumin/Globulin Ratio  0.8 %  11/24/20  15:14    


 


TSH  1.520 mlU/mL (0.270-4.200)   11/24/20  17:38    


 


Free T4  1.07 ng/dL (0.76-1.46)   11/24/20  17:38    











Drake/IV: 


                                        





IV Catheter Type [Left Forearm   INT / Saline Lock


]                                











Active Medications





- Current Medications


Current Medications: 














Generic Name Dose Route Start Last Admin





  Trade Name Freq  PRN Reason Stop Dose Admin


 


Acetaminophen  650 mg  11/24/20 17:11 





  Tylenol  PO  





  Q4H PRN  





  Pain MILD(1-3)/Fever >100.5/HA  


 


Acetaminophen/Codeine Phosphate  1 tab  11/24/20 17:35  11/25/20 05:38





  Tylenol #3  PO   1 tab





  Q6H PRN   Administration





  Pain, Moderate (4-6)  


 


Aspirin  81 mg  11/25/20 10:00  11/25/20 10:05





  Halfprin Ec  PO   81 mg





  DAILY YUNIEL   Administration


 


Carvedilol  12.5 mg  11/24/20 22:00  11/25/20 10:05





  Coreg  PO   12.5 mg





  BID YUNIEL   Administration


 


Furosemide  20 mg  11/24/20 18:00  11/25/20 05:14





  Lasix  IV   20 mg





  BID@0600,1800 YUNIEL   Administration


 


Hydralazine HCl  10 mg  11/24/20 17:14  11/25/20 03:28





  Apresoline  IV   10 mg





  Q6HR PRN   Administration





  Hypertension  


 


Ondansetron HCl  4 mg  11/24/20 17:11 





  Zofran  IV  





  Q8H PRN  





  Nausea And Vomiting  


 


Sodium Chloride  10 ml  11/24/20 22:00  11/25/20 10:06





  Sodium Chloride Flush Syringe 10 Ml  IV   10 ml





  BID YUNIEL   Administration


 


Sodium Chloride  10 ml  11/24/20 17:11 





  Sodium Chloride Flush Syringe 10 Ml  IV  





  PRN PRN  





  LINE FLUSH  


 


Spironolactone  25 mg  11/25/20 10:00  11/25/20 10:06





  Aldactone  PO   25 mg





  QDAY YUNIEL   Administration


 


Valsartan  160 mg  11/24/20 22:00  11/25/20 10:05





  Diovan  PO   160 mg





  BID YUNIEL   Administration

## 2020-12-03 NOTE — XRAY REPORT
CHEST 2 VIEWS 



INDICATION / CLINICAL INFORMATION:

Chest Pain.



COMPARISON: 

11/24/2020



FINDINGS:



SUPPORT DEVICES: None.



HEART / MEDIASTINUM: Enlarged cardiac silhouette is stable



LUNGS / PLEURA: No significant pulmonary or pleural abnormality. No pneumothorax. Previously noted pu
lmonary vascular congestion has improved.



ADDITIONAL FINDINGS: No significant additional findings.



IMPRESSION:

1. Persistent enlarged cardiac silhouette with improved central pulmonary vascular congestion.



Signer Name: Kamron Jo MD 

Signed: 12/3/2020 4:32 PM

Workstation Name: [id number]

## 2020-12-03 NOTE — EMERGENCY DEPARTMENT REPORT
ED Shortness of Breath HPI





- General


Chief Complaint: Dyspnea/Respdistress


Stated Complaint: CAN'T URINE/BACK PAIN


Time Seen by Provider: 20 21:09


Source: patient


Mode of arrival: Ambulatory


Limitations: No Limitations





- History of Present Illness


Initial Comments: 





Chief complaint: Shortness of breath not urinating HPI: This is a 40-year-old 

female with history of systolic heart failure ejection fraction 15 to 20%, 

dilated nonischemic cardiomyopathy, hypertension, ovarian cyst who presents with

shortness of breath and decrease in urination.  She states that she is not 

urinating as much as she should.  She has been compliant with Lasix and an

tihypertensive medication.  She admittedly had high sodium intake over the 

holidays.  She also informed me that she left AGAINST MEDICAL ADVICE after being

admitted recently.  I admitted patient to the hospital on .  After 

several days of admission, she left AGAINST MEDICAL ADVICE due to the 

 holiday and disagreement with nurse.  She just wanted to make sure 

that she was "okay".  She has had fatigue.  She wants to be able to run and 

exercise.  Patient denies chest pain, fever, cough.





According to recent cardiology inpatient consultation by Dr. Lyles, patient has 

had previous left heart cardiac catheterization with normal coronary arteries.

















MD Complaint: shortness of breath


-: days(s) (Several days since patient left AMA )


Severity: mild, moderate


Consistency: constant


Improves With: nothing


Worsens With: exertion


Context: recent illness (recent hospitalization, left AMA, dietary indiscretin 

with increased salt intake)


Associated Symptoms: other (decreased urination)





- Related Data


                                  Previous Rx's











 Medication  Instructions  Recorded  Last Taken  Type


 


Aspirin [Adult Low Dose Aspirin EC] 81 mg PO DAILY #30 tablet. 18 

Unknown Rx


 


Carvedilol [Coreg] 12.5 mg PO BID #60 tablet 18 Unknown Rx


 


Valsartan [Diovan] 160 mg PO BID #60 tablet 18 Unknown Rx


 


Acetaminophen/Codeine [Tylenol 1 tab PO Q6H PRN #12 tab 20 Unknown Rx





/Codeine # 3 tab]    


 


medroxyPROGESTERone ACETATE 10 mg PO QDAY 10 Days #10 tablet 20 Unknown Rx





[Provera]    


 


Furosemide [Lasix TAB] 40 mg PO BID #60 tablet 20 Unknown Rx


 


Spironolactone [Aldactone] 25 mg PO QDAY #30 tablet 20 Unknown Rx











                                    Allergies











Allergy/AdvReac Type Severity Reaction Status Date / Time


 


cyclobenzaprine HCl Allergy  Shortness Verified 17 12:03





[From Flexeril]   of Breath  


 


ibuprofen [From Motrin] Allergy  Swelling Verified 17 12:03


 


EPIDURAL MEDS Allergy  Shortness Uncoded 20 17:10





   of Breath  














ED Review of Systems


ROS: 


Stated complaint: CAN'T URINE/BACK PAIN


Other details as noted in HPI





Comment: All other systems reviewed and negative


Constitutional: denies: fever


Respiratory: shortness of breath.  denies: cough


Cardiovascular: denies: chest pain


Gastrointestinal: denies: abdominal pain, nausea, vomiting





ED Past Medical Hx





- Past Medical History


Previous Medical History?: Yes


Hx Hypertension: Yes


Hx CVA: No


Hx Heart Attack/AMI: No


Hx Congestive Heart Failure: Yes


Hx Diabetes: No


Hx Deep Vein Thrombosis: No


Hx Pulmonary Embolism: No


Hx GERD: No


Hx Liver Disease: No


Hx Renal Disease: No


Hx Sickle Cell Disease: No


Hx Arthritis: No


Hx Headaches / Migraines: No


Hx Seizures: No


Hx Kidney Stones: No


Hx Psychiatric Treatment: No


Hx Asthma: No


Hx COPD: No


Hx Tuberculosis: No


Hx Dementia: No


Hx HIV: No





- Surgical History


Past Surgical History?: Yes


Hx Coronary Stent: No


Hx Open Heart Surgery: No


Hx Pacemaker: No


Hx Internal Defibrillator: No


Hx Cholecystectomy: Yes


Hx Appendectomy: No


Hx Breast Surgery: No


Additional Surgical History: "Ovarian surgery",.   x 4.  TUBAL LIGATION





- Social History


Smoking Status: Never Smoker





- Medications


Home Medications: 


                                Home Medications











 Medication  Instructions  Recorded  Confirmed  Last Taken  Type


 


Aspirin [Adult Low Dose Aspirin EC] 81 mg PO DAILY #30 tablet. 18  

Unknown Rx


 


Carvedilol [Coreg] 12.5 mg PO BID #60 tablet 18  Unknown Rx


 


Valsartan [Diovan] 160 mg PO BID #60 tablet 18  Unknown Rx


 


Acetaminophen/Codeine [Tylenol 1 tab PO Q6H PRN #12 tab 20  Unknown Rx





/Codeine # 3 tab]     


 


medroxyPROGESTERone ACETATE 10 mg PO QDAY 10 Days #10 tablet 20  Unknown 

Rx





[Provera]     


 


Furosemide [Lasix TAB] 40 mg PO BID #60 tablet 20  Unknown Rx


 


Spironolactone [Aldactone] 25 mg PO QDAY #30 tablet 20  Unknown Rx














ED Physical Exam





- General


Limitations: No Limitations


General appearance: alert, in no apparent distress, other (Talkative, no work of

breathing, appears well)





- Head


Head exam: Present: atraumatic, normocephalic





- Eye


Eye exam: Present: normal appearance





- ENT


ENT exam: Present: mucous membranes moist





- Neck


Neck exam: Present: normal inspection, full ROM





- Respiratory


Respiratory exam: Present: normal lung sounds bilaterally.  Absent: respiratory 

distress, wheezes, rales, rhonchi





- Cardiovascular


Cardiovascular Exam: Present: regular rate, normal rhythm, normal heart sounds. 

Absent: systolic murmur, diastolic murmur, rubs, gallop





- GI/Abdominal


GI/Abdominal exam: Present: soft, normal bowel sounds.  Absent: distended, 

tenderness, guarding, rebound





- Extremities Exam


Extremities exam: Present: normal inspection





- Neurological Exam


Neurological exam: Present: alert, oriented X3





- Psychiatric


Psychiatric exam: Present: normal affect, normal mood





- Skin


Skin exam: Present: warm, dry, intact, normal color.  Absent: rash





ED Course





                                   Vital Signs











  20





  15:45


 


Temperature 98.1 F


 


Pulse Rate 102 H


 


Respiratory 18





Rate 


 


Blood Pressure 177/128





[Right] 


 


O2 Sat by Pulse 97





Oximetry 














ED Medical Decision Making





- Lab Data


Result diagrams: 


                                 20 16:26





                                 20 16:26








                         Laboratory Results - last 24 hr











  20





  16:26 16:26 16:26


 


WBC  6.3  


 


RBC  4.27  


 


Hgb  9.3 L  


 


Hct  30.3  


 


MCV  71 L  


 


MCH  22 L  


 


MCHC  31  


 


RDW  18.8 H  


 


Plt Count  249  


 


Lymph % (Auto)  33.1  


 


Mono % (Auto)  6.3  


 


Eos % (Auto)  5.3 H  


 


Baso % (Auto)  1.3  


 


Lymph # (Auto)  2.1  


 


Mono # (Auto)  0.4  


 


Eos # (Auto)  0.3  


 


Baso # (Auto)  0.1  


 


Seg Neutrophils %  54.0  


 


Seg Neutrophils #  3.4  


 


Sodium   135 L 


 


Potassium   4.5 


 


Chloride   100.7 


 


Carbon Dioxide   22 


 


Anion Gap   17 


 


BUN   20 H 


 


Creatinine   1.3 H 


 


Estimated GFR   55 


 


BUN/Creatinine Ratio   15 


 


Glucose   102 H 


 


Calcium   9.1 


 


Total Bilirubin    1.10


 


Direct Bilirubin    0.3 H


 


Indirect Bilirubin    0.8


 


AST    16


 


ALT    20


 


Alkaline Phosphatase    101


 


Troponin T   0.043 H 


 


NT-Pro-B Natriuret Pep    6409 H


 


Total Protein    6.8


 


Albumin    3.5 L


 


Albumin/Globulin Ratio    1.1


 


Triglycerides   60 


 


Cholesterol   100 


 


LDL Cholesterol Direct   62 


 


HDL Cholesterol   31 L 


 


Cholesterol/HDL Ratio   3.22 














  20





  20:02


 


WBC 


 


RBC 


 


Hgb 


 


Hct 


 


MCV 


 


MCH 


 


MCHC 


 


RDW 


 


Plt Count 


 


Lymph % (Auto) 


 


Mono % (Auto) 


 


Eos % (Auto) 


 


Baso % (Auto) 


 


Lymph # (Auto) 


 


Mono # (Auto) 


 


Eos # (Auto) 


 


Baso # (Auto) 


 


Seg Neutrophils % 


 


Seg Neutrophils # 


 


Sodium 


 


Potassium 


 


Chloride 


 


Carbon Dioxide 


 


Anion Gap 


 


BUN 


 


Creatinine 


 


Estimated GFR 


 


BUN/Creatinine Ratio 


 


Glucose 


 


Calcium 


 


Total Bilirubin 


 


Direct Bilirubin 


 


Indirect Bilirubin 


 


AST 


 


ALT 


 


Alkaline Phosphatase 


 


Troponin T  0.050 H


 


NT-Pro-B Natriuret Pep 


 


Total Protein 


 


Albumin 


 


Albumin/Globulin Ratio 


 


Triglycerides 


 


Cholesterol 


 


LDL Cholesterol Direct 


 


HDL Cholesterol 


 


Cholesterol/HDL Ratio 














- Radiology Data


Radiology results: report reviewed, image reviewed





CHEST 2 VIEWS 





INDICATION / CLINICAL INFORMATION: 


Chest Pain. 





COMPARISON: 


2020 





FINDINGS: 





SUPPORT DEVICES: None. 





HEART / MEDIASTINUM: Enlarged cardiac silhouette is stable 





LUNGS / PLEURA: No significant pulmonary or pleural abnormality. No 

pneumothorax. Previously noted 


 pulmonary vascular congestion has improved. 





ADDITIONAL FINDINGS: No significant additional findings. 





IMPRESSION: 


1. Persistent enlarged cardiac silhouette with improved central pulmonary 

vascular congestion. 








- Medical Decision Making





1.  Acute systolic heart failure: Patient actually appears much better on 

today's exam.  On previous examination on , patient had tachypnea at 

rest.  She was unable to complete full word sentences sitting upright in 

stretcher.  Today patient is ambulatory no difficulty.  She is talkative.  She 

is lively and animated.  I reviewed the images of the chest radiographs.  No 

significant pulmonary edema.  Patient does have persistent severely enlarged 

cardiac silhouette.  Patient does not require hospitalization at this time.  





2.  Hypertensive urgency: Patient had blood pressure 177/128 upon arrival.  

Blood pressure increased after 6-hour stay.  She will take her home medications 

here in the emergency department.





I have reviewed labs which are notable for elevated BNP.  She also has elevated 

persistently elevated troponin.  I do not suspect acute MI or ACS..  

Persistently elevated troponin reflective of patient's cardiomyopathy.  Patient 

has history of left heart catheterization with normal coronary arteries.





I strongly encouraged follow-up with her personal cardiologist Dr. Lyles.  Also 

suggested and gave written instructions for low-salt diet.





Upon my review, relatively normal kidney function.


Critical care attestation.: 


If time is entered above; I have spent that time in minutes in the direct care 

of this critically ill patient, excluding procedure time.








ED Disposition


Clinical Impression: 


 Systolic heart failure, Cardiomyopathy, dilated, nonischemic, Hypertensive 

urgency





Disposition: DC-01 TO HOME OR SELFCARE


Is pt being admited?: No


Does the pt Need Aspirin: No


Condition: Stable


Instructions:  Living With Heart Failure, Heart Failure, Self Care, Easy-to-[place]

d, Low-Sodium Eating Plan


Referrals: 


ROSENDA LYLES MD [Staff Physician] - 3-5 Days

## 2020-12-03 NOTE — EVENT NOTE
ED Screening Note


Date of service: 12/03/20


Time: 20:41


ED Screening Note: 





Complains of shortness of breath and decreased urination


Recently admitted 11/24/2020


History of CHF 





This initial assessment/diagnostic orders/clinical plan/treatment(s) is/are 

subject to change based on patients health status, clinical progression and re-

assessment by fellow clinical providers in the ED. Further treatment and workup 

at subsequent clinical providers discretion. Patient/guardian urged not to elope

from the ED as their condition may be serious if not clinically assessed and 

managed. 





Initial orders include: 


Labs


Chest x-ray


EKG

## 2021-02-03 NOTE — EVENT NOTE
ED Screening Note


ED Screening Note: 





lower abd pain that began 3-4 days ago 


hx of CHF


+exertional SOB worsened over the last couple days 


not able to lay flat 


states she on 80 mg of lasix once a day 


+CP substernal 


+nausea


no v/d


+diaphoresis


no leg swelling 


hx of HTN, anemia


sees Dr. espinoza, cardiology was advised to be seen in ER 





This initial assessment/diagnostic orders/clinical plan/treatment(s) is/are 

subject to change based on patients health status, clinical progression and re-

assessment by fellow clinical providers in the ED. Further treatment and workup 

at subsequent clinical providers discretion. Patient/guardian urged not to elope

from the ED as their condition may be serious if not clinically assessed and 

managed. 





Initial orders include: 


CP protocol

## 2021-02-03 NOTE — CAT SCAN REPORT
CT OF THE ABDOMEN AND PELVIS WITH INTRAVENOUS CONTRAST 



INDICATION / CLINICAL INFORMATION:

Generalized abdominal pain.



TECHNIQUE:

All CT scans at this location are performed using CT dose reduction for ALARA by means of automated e
xposure control. 



COMPARISON:

06/28/17.



FINDINGS:

ABDOMEN: The gallbladder is surgically absent. The liver, spleen, bile ducts, pancreas, adrenal gland
s, kidneys and bowel are normal. No adenopathy is seen. There is marked cardiomegaly. Mild bibasilar 
subsegmental atelectasis is present.



PELVIS: There is a 5.5 cm ovoid simple cyst in the left adnexa, new since the prior study. The uterus
 and right adnexa are unremarkable. No significant free fluid.



There is no evidence of appendicitis or diverticulitis. Mild to moderate generalized body wall edema 
is noted. There is a small fat-containing periumbilical hernia without complication. No acute osseous
 abnormality is seen.



IMPRESSION: 5.5 cm simple appearing left ovarian cyst. No significant free fluid. 



Signer Name: Manny Fried MD 

Signed: 2/3/2021 11:50 PM

Workstation Name: [id number]

## 2021-02-03 NOTE — XRAY REPORT
CHEST 2 VIEWS 



INDICATION / CLINICAL INFORMATION:

Chest Pain.



COMPARISON: 

12/3/2020



FINDINGS:



SUPPORT DEVICES: None.

HEART / MEDIASTINUM: Cardiomegaly 

LUNGS / PLEURA: No significant pulmonary or pleural abnormality. No pneumothorax. 



ADDITIONAL FINDINGS: No significant additional findings.



IMPRESSION:

Marked cardiomegaly without acute disease or interval change from 12/3/2020



Signer Name: Rios Upton MD FACR 

Signed: 2/3/2021 6:31 PM

Workstation Name: [id number]

## 2021-02-03 NOTE — EMERGENCY DEPARTMENT REPORT
ED Shortness of Breath HPI





- General


Chief Complaint: Abdominal Pain


Stated Complaint: ABD PAIN


Time Seen by Provider: 21 17:49


Source: patient, EMS


Mode of arrival: Ambulatory


Limitations: No Limitations





- History of Present Illness


Initial Comments: 





Patient is 40 years old female with history of congestive heart failure and 

hypertension.  Patient presented to the ER complaining of shortness of breath 

and difficulty breathing for the last few days.  Patient stated that she is 

taking her Lasix but no improvement patient reported significant orthopnea.  

Patient spoke to Dr. Lyles who advised her to come to the emergency room.  

Patient also complaining of diffuse abdominal pain for the last 2 to 3 days.  

Patient denied any fever however stated that she is having some chills.  Patient

denied any nausea vomiting or diarrhea.  No chest pain.


MD Complaint: shortness of breath, cough


-: Gradual, days(s)


Severity: moderate


Known History Of: congestive heart failure





- Related Data


                                  Previous Rx's











 Medication  Instructions  Recorded  Last Taken  Type


 


Aspirin [Adult Low Dose Aspirin EC] 81 mg PO DAILY #30 tablet. 18 

Unknown Rx


 


Carvedilol [Coreg] 12.5 mg PO BID #60 tablet 18 Unknown Rx


 


Valsartan [Diovan] 160 mg PO BID #60 tablet 18 Unknown Rx


 


Acetaminophen/Codeine [Tylenol 1 tab PO Q6H PRN #12 tab 20 Unknown Rx





/Codeine # 3 tab]    


 


medroxyPROGESTERone ACETATE 10 mg PO QDAY 10 Days #10 tablet 20 Unknown Rx





[Provera]    


 


Furosemide [Lasix TAB] 40 mg PO BID #60 tablet 20 Unknown Rx


 


Spironolactone [Aldactone] 25 mg PO QDAY #30 tablet 20 Unknown Rx











                                    Allergies











Allergy/AdvReac Type Severity Reaction Status Date / Time


 


cyclobenzaprine HCl Allergy  Shortness Verified 21 17:45





[From Flexeril]   of Breath  


 


ibuprofen [From Motrin] Allergy  Swelling Verified 21 17:45


 


EPIDURAL MEDS Allergy  Shortness Uncoded 20 17:10





   of Breath  














ED Review of Systems


ROS: 


Stated complaint: ABD PAIN


Other details as noted in HPI





Comment: All other systems reviewed and negative


Constitutional: chills.  denies: fever


Respiratory: orthopnea, shortness of breath, SOB with exertion, SOB at rest.  

denies: cough


Cardiovascular: denies: chest pain, palpitations


Gastrointestinal: abdominal pain.  denies: nausea, vomiting, diarrhea


Musculoskeletal: denies: back pain


Neurological: denies: headache





ED Past Medical Hx





- Past Medical History


Hx Hypertension: Yes


Hx CVA: No


Hx Heart Attack/AMI: No


Hx Congestive Heart Failure: Yes


Hx Diabetes: No


Hx Deep Vein Thrombosis: No


Hx Pulmonary Embolism: No


Hx GERD: No


Hx Liver Disease: No


Hx Renal Disease: No


Hx Sickle Cell Disease: No


Hx Arthritis: No


Hx Headaches / Migraines: No


Hx Seizures: No


Hx Kidney Stones: No


Hx Psychiatric Treatment: No


Hx Asthma: No


Hx COPD: No


Hx Tuberculosis: No


Hx Dementia: No


Hx HIV: No





- Surgical History


Hx Coronary Stent: No


Hx Open Heart Surgery: No


Hx Pacemaker: No


Hx Internal Defibrillator: No


Hx Cholecystectomy: Yes


Hx Appendectomy: No


Hx Breast Surgery: No


Additional Surgical History: "Ovarian surgery",.   x 4.  TUBAL LIGATION





- Social History


Smoking Status: Never Smoker





- Medications


Home Medications: 


                                Home Medications











 Medication  Instructions  Recorded  Confirmed  Last Taken  Type


 


Aspirin [Adult Low Dose Aspirin EC] 81 mg PO DAILY #30 tablet.dr 18  

Unknown Rx


 


Carvedilol [Coreg] 12.5 mg PO BID #60 tablet 18  Unknown Rx


 


Valsartan [Diovan] 160 mg PO BID #60 tablet 18  Unknown Rx


 


Acetaminophen/Codeine [Tylenol 1 tab PO Q6H PRN #12 tab 20  Unknown Rx





/Codeine # 3 tab]     


 


medroxyPROGESTERone ACETATE 10 mg PO QDAY 10 Days #10 tablet 20  Unknown 

Rx





[Provera]     


 


Furosemide [Lasix TAB] 40 mg PO BID #60 tablet 20  Unknown Rx


 


Spironolactone [Aldactone] 25 mg PO QDAY #30 tablet 20  Unknown Rx














ED Physical Exam





- General


Limitations: No Limitations


General appearance: alert, in distress





- Head


Head exam: Present: atraumatic, normocephalic, normal inspection





- Eye


Eye exam: Present: normal appearance





- ENT


ENT exam: Present: normal exam, normal orophraynx, mucous membranes moist





- Neck


Neck exam: Present: normal inspection, full ROM.  Absent: tenderness, 

meningismus





- Respiratory


Respiratory exam: Present: respiratory distress, decreased breath sounds.  

Absent: wheezes, rales, rhonchi, accessory muscle use, prolonged expiratory





- Cardiovascular


Cardiovascular Exam: Present: regular rate, normal rhythm, normal heart sounds





- GI/Abdominal


GI/Abdominal exam: Present: soft, normal bowel sounds.  Absent: distended, 

tenderness, guarding, rebound, rigid, organomegaly, mass, bruit, pulsatile mass,

 hernia





- Extremities Exam


Extremities exam: Present: normal inspection, full ROM, normal capillary refill.

  Absent: pedal edema, calf tenderness





- Back Exam


Back exam: Present: normal inspection, full ROM.  Absent: CVA tenderness (R), 

CVA tenderness (L)





- Neurological Exam


Neurological exam: Present: alert, oriented X3, CN II-XII intact





- Psychiatric


Psychiatric exam: Present: normal mood





- Skin


Skin exam: Present: warm, intact, normal color





ED Course


                                   Vital Signs











  21





  17:47


 


Temperature 98.1 F


 


Pulse Rate 61


 


Respiratory 16





Rate 


 


Blood Pressure 157/114


 


O2 Sat by Pulse 93





Oximetry 














ED Medical Decision Making





- Lab Data


Result diagrams: 


                                 21 18:43





                                 21 18:43





- EKG Data


-: EKG Interpreted by Me


EKG shows normal: sinus rhythm


Rate: tachycardia





- EKG Data


Interpretation: no acute changes





- Radiology Data


Radiology results: report reviewed





- Medical Decision Making





Patient is 40 years old female with history of congestive heart failure and 

hypertension.  Patient presented to the ER complaining of shortness of breath 

and difficulty breathing for the last few days.  Patient stated that she is 

taking her Lasix but no improvement patient reported significant orthopnea.  

Patient spoke to Dr. Lyles who advised her to come to the emergency room.  P

atient also complaining of diffuse abdominal pain for the last 2 to 3 days.  

Patient denied any fever however stated that she is having some chills.  Patient

 denied any nausea vomiting or diarrhea.  No chest pain.





EKG shows sinus tachycardia.  Labs reviewed and showed significantly elevated 

BNP.  Patient clinically and moderate respiratory distress.  Patient received 

Lasix 60 mg IV.  Patient troponin slightly elevated.  Patient given aspirin.  I 

discussed the patient with Dr. Mcrae.  He agreed to admit the patient to 

medical service for further management.








Critical Care Time: Yes


Critical care time in (mins) excluding proc time.: 30


Critical care attestation.: 


If time is entered above; I have spent that time in minutes in the direct care 

of this critically ill patient, excluding procedure time.








ED Disposition


Clinical Impression: 


 Acute on chronic systolic heart failure, Abdominal pain





Disposition: DC09 OP ADMIT IP TO THIS HOSP


Is pt being admited?: Yes


Condition: Stable


Instructions:  Abdominal Pain (ED)


Referrals: 


PRIMARY CARE,MD [Primary Care Provider] - 3-5 Days

## 2021-02-04 NOTE — CONSULTATION
History of Present Illness


Consult date: 21


Consult reason: congestive heart failure


History of present illness: 





This is a 40-year old woman with a history of dilated nonischemic cardiomyopathy

with left ventricular ejection fraction 15-20% by recent echo. She presents to 

the hospital with abdominal pain, nausea and vomiting. In addition, she 

complained of shortness of breath. Noted hypertensive with a systolic BP greater

than 200 on presentation. She states that she has been compliant with her 

medical therapy but has not seen her cardiologist as scheduled. Denies chest 

pain and there is no lower extremity edema lower extremity edema. A chest x-ray 

done reports cardiomegaly but no evidence of interstitial edema. Labs most 

notable for elevated liver enzymes. An ECG shows sinus rhythm, LVH with 

repolarization abnormalities. 





Past History


Past Medical History: diabetes, heart failure, hypertension


Past Surgical History: cholecystectomy, , Other (Ovarian surgery, Tubal

ligation)


Social history: smoking


Family history: no significant family history





Medications and Allergies


                                    Allergies











Allergy/AdvReac Type Severity Reaction Status Date / Time


 


cyclobenzaprine HCl Allergy  Shortness Verified 21 17:45





[From Flexeril]   of Breath  


 


ibuprofen [From Motrin] Allergy  Swelling Verified 21 17:45


 


EPIDURAL MEDS Allergy  Shortness Uncoded 20 17:10





   of Breath  











                                Home Medications











 Medication  Instructions  Recorded  Confirmed  Last Taken  Type


 


Aspirin [Adult Low Dose Aspirin EC] 81 mg PO DAILY #30 tablet. 18  

Unknown Rx


 


Carvedilol [Coreg] 12.5 mg PO BID #60 tablet 18  Unknown Rx


 


Valsartan [Diovan] 160 mg PO BID #60 tablet 18  Unknown Rx


 


Acetaminophen/Codeine [Tylenol 1 tab PO Q6H PRN #12 tab 20  Unknown Rx





/Codeine # 3 tab]     


 


medroxyPROGESTERone ACETATE 10 mg PO QDAY 10 Days #10 tablet 20  Unknown 

Rx





[Provera]     


 


Furosemide [Lasix TAB] 40 mg PO BID #60 tablet 20  Unknown Rx


 


Spironolactone [Aldactone] 25 mg PO QDAY #30 tablet 20  Unknown Rx











Active Meds: 


Active Medications





Acetaminophen (Acetaminophen 325 Mg Tab)  650 mg PO Q4H PRN


   PRN Reason: Pain MILD(1-3)/Fever >100.5/HA


Aspirin (Aspirin Ec 81 Mg Tab)  81 mg PO DAILY [facility]


   Last Admin: 21 09:27 Dose:  81 mg


   Documented by: 


Carvedilol (Carvedilol 12.5 Mg Tab)  12.5 mg PO BID [facility]


   Last Admin: 21 09:27 Dose:  12.5 mg


   Documented by: 


Furosemide (Furosemide 40 Mg/4 Ml Inj)  40 mg IV BID@0600,1800 [facility]


   Last Admin: 21 05:42 Dose:  40 mg


   Documented by: 


Heparin Sodium (Porcine) (Heparin 5,000 Unit/1 Ml Vial)  5,000 unit SUB-Q Q8HR 

[facility]


Magnesium Hydroxide (Magnesium Hydroxide (Mom) Oral Liqd Udc)  30 ml PO Q4H PRN


   PRN Reason: Constipation


Medroxyprogesterone Acetate (Medroxyprogesterone Acetate 5 Mg Tab)  10 mg PO 

QDAY [facility]


   Last Admin: 21 09:27 Dose:  10 mg


   Documented by: 


Morphine Sulfate (Morphine 2 Mg/1 Ml Inj)  2 mg IV Q4H PRN


   PRN Reason: Pain, Moderate (4-6)


   Last Admin: 21 08:16 Dose:  2 mg


   Documented by: 


Ondansetron HCl (Ondansetron 4 Mg/2 Ml Inj)  4 mg IV Q8H PRN


   PRN Reason: Nausea And Vomiting


Sodium Chloride (Sodium Chloride 0.9% 10 Ml Flush Syringe)  10 ml IV BID [facility]


   Last Admin: 21 09:28 Dose:  10 ml


   Documented by: 


Sodium Chloride (Sodium Chloride 0.9% 10 Ml Flush Syringe)  10 ml IV PRN PRN


   PRN Reason: LINE FLUSH


   Last Admin: 21 05:42 Dose:  10 ml


   Documented by: 


Spironolactone (Spironolactone 25 Mg Tab)  25 mg PO QDAY [facility]


   Last Admin: 21 09:27 Dose:  25 mg


   Documented by: 


Valsartan (Valsartan 160mg Tab)  160 mg PO BID [facility]


   Last Admin: 21 09:27 Dose:  160 mg


   Documented by: 











Review of Systems


Cardiovascular: shortness of breath, no chest pain, no orthopnea, no 

palpitations, no rapid/irregular heart beat, no edema, no syncope, no 

lightheadedness





Physical Examination


                                   Vital Signs











Temp Pulse Resp BP Pulse Ox


 


 98.1 F   61   16   157/114   93 


 


 21 17:47  21 17:47  21 17:47  21 17:47  21 17:47











General appearance: no acute distress


HEENT: Positive: PERRL


Neck: Positive: trachea midline


Cardiac: Positive: Reg Rate and Rhythm


Lungs: Positive: Decreased Breath Sounds


Neuro: Positive: Grossly Intact


Extremities: Absent: edema





Results





                                 21 18:43





                                 21 18:43


                                 Cardiac Enzymes











  21 Range/Units





  18:43 


 


AST  53 H  (5-40)  units/L








                                   Coagulation











  21 Range/Units





  18:43 


 


PT  17.6 H  (12.2-14.9)  Sec.


 


INR  1.46 H  (0.87-1.13)  


 


APTT  33.3  (24.2-36.6)  Sec.








                                     Lipids











  21 Range/Units





  18:43 


 


Triglycerides  53  (2-149)  mg/dL


 


Cholesterol  79  ()  mg/dL


 


HDL Cholesterol  18 L  (40-59)  mg/dL


 


Cholesterol/HDL Ratio  4.38  %








                                       CBC











  21 Range/Units





  18:43 


 


WBC  7.9  (4.5-11.0)  K/mm3


 


RBC  4.49  (3.65-5.03)  M/mm3


 


Hgb  9.5 L  (10.1-14.3)  gm/dl


 


Hct  31.7  (30.3-42.9)  %


 


Plt Count  284  (140-440)  K/mm3


 


Lymph # (Auto)  2.9  (1.2-5.4)  K/mm3


 


Mono # (Auto)  0.4  (0.0-0.8)  K/mm3


 


Eos # (Auto)  0.0  (0.0-0.4)  K/mm3


 


Baso # (Auto)  0.1  (0.0-0.1)  K/mm3








                          Comprehensive Metabolic Panel











  21 Range/Units





  18:43 


 


Sodium  136 L  (137-145)  mmol/L


 


Potassium  3.9  (3.6-5.0)  mmol/L


 


Chloride  99.5  ()  mmol/L


 


Carbon Dioxide  28  (22-30)  mmol/L


 


BUN  19 H  (7-17)  mg/dL


 


Creatinine  1.3 H  (0.6-1.2)  mg/dL


 


Glucose  125 H  ()  mg/dL


 


Calcium  8.7  (8.4-10.2)  mg/dL


 


AST  53 H  (5-40)  units/L


 


ALT  114 H  (7-56)  units/L


 


Alkaline Phosphatase  115  ()  units/L


 


Total Protein  6.4  (6.3-8.2)  g/dL


 


Albumin  3.3 L  (3.9-5)  g/dL














Assessment and Plan





Accelerated Hypertension


Hx of Nonischemic CMP


   LVEF 15-20% by echo 2020


Abdominal pain with N/V


Elevated liver enzymes

## 2021-02-04 NOTE — HISTORY AND PHYSICAL REPORT
History of Present Illness


Date of examination: 21


Date of admission: 


21 00:04





Chief complaint: 





Shortness of Breath


History of present illness: 





40-year-old female with known history of hypertension and congestive heart 

failure presenting to the emergency room today complaining of difficulty 

breathing over the past few days.  She has also been having some orthopnea.  She

is follows up with the cardiologist Dr. Lyles who had encouraged her to report 

to the emergency room for further evaluation.


Patient indicates that she has been taking her diuretics-furosemide without any 

significant improvement.  She has also been having some lower abdominal pain 

over the past 2 to 3 days.


Patient denies any fever or chills, denies any chest pain, no headache or 

dizziness, no nausea vomiting, no diarrhea.  Patient denies any sick contacts 

and no recent travel.  Denies any contact with anyone with COVID-19.





Work-up in the emergency room today reveals elevated BNP.  Chest x-ray did not 

show any acute findings.  CT of the abdomen and pelvis reveals:5.5 cm simple 

appearing left ovarian cyst. No significant free fluid.





Patient has been admitted with CHF exacerbation.





Past History


Past Medical History: diabetes, heart failure, hypertension


Past Surgical History: cholecystectomy, , Other (Ovarian surgery, Tubal

ligation)


Social history: no significant social history


Family history: no significant family history





Medications and Allergies


                                    Allergies











Allergy/AdvReac Type Severity Reaction Status Date / Time


 


cyclobenzaprine HCl Allergy  Shortness Verified 21 17:45





[From Flexeril]   of Breath  


 


ibuprofen [From Motrin] Allergy  Swelling Verified 21 17:45


 


EPIDURAL MEDS Allergy  Shortness Uncoded 20 17:10





   of Breath  











                                Home Medications











 Medication  Instructions  Recorded  Confirmed  Last Taken  Type


 


Aspirin [Adult Low Dose Aspirin EC] 81 mg PO DAILY #30 tablet. 18  

Unknown Rx


 


Carvedilol [Coreg] 12.5 mg PO BID #60 tablet 18  Unknown Rx


 


Valsartan [Diovan] 160 mg PO BID #60 tablet 18  Unknown Rx


 


Acetaminophen/Codeine [Tylenol 1 tab PO Q6H PRN #12 tab 20  Unknown Rx





/Codeine # 3 tab]     


 


medroxyPROGESTERone ACETATE 10 mg PO QDAY 10 Days #10 tablet 20  Unknown 

Rx





[Provera]     


 


Furosemide [Lasix TAB] 40 mg PO BID #60 tablet 20  Unknown Rx


 


Spironolactone [Aldactone] 25 mg PO QDAY #30 tablet 20  Unknown Rx











Active Meds: 


Active Medications





Acetaminophen (Acetaminophen 325 Mg Tab)  650 mg PO Q4H PRN


   PRN Reason: Pain MILD(1-3)/Fever >100.5/HA


Furosemide (Furosemide 40 Mg/4 Ml Inj)  40 mg IV BID@0600,1800 [facility]


Magnesium Hydroxide (Magnesium Hydroxide (Mom) Oral Liqd Udc)  30 ml PO Q4H PRN


   PRN Reason: Constipation


Morphine Sulfate (Morphine 2 Mg/1 Ml Inj)  2 mg IV Q4H PRN


   PRN Reason: Pain, Moderate (4-6)


Ondansetron HCl (Ondansetron 4 Mg/2 Ml Inj)  4 mg IV Q8H PRN


   PRN Reason: Nausea And Vomiting


Sodium Chloride (Sodium Chloride 0.9% 10 Ml Flush Syringe)  10 ml IV BID [facility]


Sodium Chloride (Sodium Chloride 0.9% 10 Ml Flush Syringe)  10 ml IV PRN PRN


   PRN Reason: LINE FLUSH











Review of Systems


Constitutional: no fever, no chills


Ears, nose, mouth and throat: no nasal congestion, no sore throat


Cardiovascular: orthopnea, shortness of breath, dyspnea on exertion, no chest 

pain, no palpitations


Respiratory: no cough, no shortness of breath


Gastrointestinal: no abdominal pain, no nausea, no vomiting, no diarrhea


Genitourinary Female: no pelvic pain, no flank pain, no dysuria, no hematuria


Musculoskeletal: no neck pain, no low back pain


Integumentary: no rash, no pruritis


Neurological: no headaches, no confusion


Psychiatric: no anxiety, no depression





Exam





- Constitutional


Vitals: 


                                        











Temp Pulse Resp BP Pulse Ox


 


 98.1 F   103 H  16   147/105   99 


 


 21 17:47  21 00:31  21 00:31  21 00:31  21 00:31











General appearance: Present: no acute distress, well-nourished





- EENT


Eyes: Present: PERRL, EOM intact.  Absent: scleral icterus


ENT: hearing intact, clear oral mucosa, dentition normal





- Neck


Neck: Present: supple, normal ROM





- Respiratory


Respiratory effort: normal


Respiratory: bilateral: CTA





- Cardiovascular


Rhythm: regular


Heart Sounds: Present: S1 & S2.  Absent: gallop, systolic murmur, diastolic 

murmur, rub, click





- Extremities


Extremities: no ischemia, pulses intact, pulses symmetrical, No edema, normal 

temperature, normal color, Full ROM


Peripheral Pulses: within normal limits





- Abdominal


General gastrointestinal: Present: soft, non-tender, non-distended, normal bowel

 sounds.  Absent: mass





- Integumentary


Integumentary: Present: clear, warm, dry.  Absent: rash





- Musculoskeletal


Musculoskeletal: strength equal bilaterally





- Psychiatric


Psychiatric: appropriate mood/affect, intact judgment & insight, memory intact, 

cooperative





- Neurologic


Neurologic: CNII-XII intact, no focal deficits, moves all extremities





HEART Score





- HEART Score


Troponin: 


                                        











Troponin T  0.089 ng/mL (0.00-0.029)  H  21  21:01    














Results





- Labs


CBC & Chem 7: 


                                 21 18:43





                                 21 18:43


Labs: 


                              Abnormal lab results











  21 Range/Units





  18:43 18:43 18:43 


 


Hgb  9.5 L    (10.1-14.3)  gm/dl


 


MCV  71 L    (79-97)  fl


 


MCH  21 L    (28-32)  pg


 


RDW  21.0 H    (13.2-15.2)  %


 


Lymph % (Auto)  36.6 H    (13.4-35.0)  %


 


PT   17.6 H   (12.2-14.9)  Sec.


 


INR   1.46 H   (0.87-1.13)  


 


Sodium    136 L  (137-145)  mmol/L


 


BUN    19 H  (7-17)  mg/dL


 


Creatinine    1.3 H  (0.6-1.2)  mg/dL


 


Glucose    125 H  ()  mg/dL


 


Total Bilirubin    1.50 H  (0.1-1.2)  mg/dL


 


AST    53 H  (5-40)  units/L


 


ALT    114 H  (7-56)  units/L


 


Troponin T    0.083 H  (0.00-0.029)  ng/mL


 


NT-Pro-B Natriuret Pep    6418 H  (0-450)  pg/mL


 


Albumin    3.3 L  (3.9-5)  g/dL


 


HDL Cholesterol    18 L  (40-59)  mg/dL














  21 Range/Units





  21:01 


 


Hgb   (10.1-14.3)  gm/dl


 


MCV   (79-97)  fl


 


MCH   (28-32)  pg


 


RDW   (13.2-15.2)  %


 


Lymph % (Auto)   (13.4-35.0)  %


 


PT   (12.2-14.9)  Sec.


 


INR   (0.87-1.13)  


 


Sodium   (137-145)  mmol/L


 


BUN   (7-17)  mg/dL


 


Creatinine   (0.6-1.2)  mg/dL


 


Glucose   ()  mg/dL


 


Total Bilirubin   (0.1-1.2)  mg/dL


 


AST   (5-40)  units/L


 


ALT   (7-56)  units/L


 


Troponin T  0.089 H  (0.00-0.029)  ng/mL


 


NT-Pro-B Natriuret Pep   (0-450)  pg/mL


 


Albumin   (3.9-5)  g/dL


 


HDL Cholesterol   (40-59)  mg/dL














Assessment and Plan





- Patient Problems


(1) Acute on chronic systolic heart failure


Current Visit: Yes   Status: Acute   


Plan to address problem: 


Patient admitted and placed on telemetry.  She has been commenced on IV Lasix.  

Will monitor inputs and outputs and also monitor daily weight.


We will request cardiology follow-up.








(2) Abdominal pain


Current Visit: Yes   Status: Acute   


Plan to address problem: 


He will be placed on IV analgesic medication as needed.  CT of the abdomen and 

pelvis reveals ovarian cyst.


May request gynecology follow-up as needed.








(3) DVT prophylaxis


Current Visit: No   Status: Acute   


Plan to address problem: 


Patient placed on subcutaneous heparin.








(4) Full code status


Current Visit: Yes   Status: Acute   


Plan to address problem: 


Patient is a full code.

## 2021-02-05 NOTE — PROGRESS NOTE
Assessment and Plan





Acute systolic heart failure


Hypertension


Hx of Nonischemic CMP


   LVEF 15-20% by echo 11/2020


Abdominal pain with N/V


Elevated liver enzymes





Recommendations:


Dietary restrictions.


Daily weight and strick I's and O's.


Aggressive medical therapy for decompensated heart failure including a trial of 

IV milrinone for 48 hours.





Subjective


Date of service: 02/05/21


Interval history: 





Patient reports shortness of breath on exertion. No distress noted.


Blood pressure is controlled.





Objective


                                   Vital Signs











  Temp Pulse Resp BP BP Pulse Ox


 


 02/05/21 07:33  98.7 F  85  22  128/95   96


 


 02/05/21 05:26  97.9 F  79  18  120/76   95


 


 02/05/21 04:11   75    


 


 02/05/21 02:21   75     99


 


 02/05/21 02:18  98.5 F   20  108/72  


 


 02/05/21 00:54   91 H  22   159/103  97


 


 02/05/21 00:38  97.6 F  80  18  109/72   100


 


 02/04/21 21:02   119 H   120/85  


 


 02/04/21 21:00   119 H   120/85  


 


 02/04/21 19:50   77    


 


 02/04/21 19:44  97.4 F L  80  20  120/85   100


 


 02/04/21 16:00  97.7 F  88  18   127/92 


 


 02/04/21 11:32  98.1 F  92 H  18  123/89   98














- Physical Examination


General: No Apparent Distress


HEENT: Positive: PERRL


Neck: Positive: trachea midline


Cardiac: Positive: Reg Rate and Rhythm


Lungs: Positive: Decreased Breath Sounds


Neuro: Positive: Grossly Intact


Extremities: Absent: edema





- Labs and Meds


                                   Coagulation











  02/05/21 Range/Units





  04:29 


 


PT  14.5  (12.2-14.9)  Sec.


 


INR  1.14 H  (0.87-1.13)  








                                       CBC











  02/05/21 Range/Units





  04:29 


 


WBC  7.5  (4.5-11.0)  K/mm3


 


RBC  4.08  (3.65-5.03)  M/mm3


 


Hgb  8.7 L  (10.1-14.3)  gm/dl


 


Hct  28.4 L  (30.3-42.9)  %


 


Plt Count  224  (140-440)  K/mm3


 


Lymph # (Auto)  3.1  (1.2-5.4)  K/mm3


 


Mono # (Auto)  0.4  (0.0-0.8)  K/mm3


 


Eos # (Auto)  0.2  (0.0-0.4)  K/mm3


 


Baso # (Auto)  0.0  (0.0-0.1)  K/mm3








                          Comprehensive Metabolic Panel











  02/04/21 02/05/21 Range/Units





  10:33 04:29 


 


Sodium  141  136 L  (137-145)  mmol/L


 


Potassium  3.7  4.0  (3.6-5.0)  mmol/L


 


Chloride  101.0  98.7  ()  mmol/L


 


Carbon Dioxide  33 H  27  (22-30)  mmol/L


 


BUN  18 H  23 H  (7-17)  mg/dL


 


Creatinine  1.2  1.5 H  (0.6-1.2)  mg/dL


 


Glucose  108 H  107 H  ()  mg/dL


 


Calcium  8.1 L  8.3 L  (8.4-10.2)  mg/dL

## 2021-02-05 NOTE — XRAY REPORT
RIGHT KNEE 2 VIEWS



INDICATION / CLINICAL INFORMATION:

Fall with right knee pain.



COMPARISON:

None available.

 

FINDINGS:



BONES / JOINT(S): There is chronic spurring/fragmentation involving the anterior tibial tubercle. The
re is mild spurring involving the upper pole of the patella at the quadriceps insertion. There is no 
evidence of fracture, subluxation or joint effusion.

SOFT TISSUES: No significant abnormality.



ADDITIONAL FINDINGS: None.







Signer Name: Manny Fried MD 

Signed: 2/5/2021 2:05 AM

Workstation Name: [id number]

## 2021-02-05 NOTE — DISCHARGE SUMMARY
Providers





- Providers


Date of Admission: 


02/04/21 00:04





Date of discharge: 02/05/21


Attending physician: 


MI ZAZUETA





                                        





02/04/21 00:57


Consult to Physician [CONS] Routine 


   Comment: 


   Consulting Provider: ROSENDA VIEYRA


   Physician Instructions: 


   Reason For Exam: CHF











Primary care physician: 


PRIMARY CARE MD








Hospitalization


Condition: Stable


Hospital course: 





Discharge diagnosis:


Acute systolic heart failure, resolved


Hypertension, stable


Hx of Nonischemic CMP, LVEF 15-20% by echo 11/2020


Abdominal pain with N/V, 


-CT abdomen showed no intraabdominal acute issue


Left adnexal cystic mass


-Ordered pelvic ultrasound


-Patient was recommended to follow-up with OB/GYN as outpatient for further 

evaluation and management


Elevated liver enzymes, likely chronic from chronic CHF


HEATHER on possible CKD, likely from diuresis, cr at baseline


Disposition: DC-01 TO HOME OR SELFCARE


Time spent for discharge: 34 minutes





Core Measure Documentation





- Palliative Care


Palliative Care/ Comfort Measures: Not Applicable





- Core Measures


Any of the following diagnoses?: none





Exam





- Constitutional


Vitals: 


                                        











Temp Pulse Resp BP Pulse Ox


 


 98.1 F   82   18   120/86   99 


 


 02/05/21 12:09  02/05/21 12:09  02/05/21 12:09  02/05/21 12:09  02/05/21 12:09














Plan


Activity: advance as tolerated


Weight Bearing Status: Weight Bear as Tolerated


Diet: low fat, low salt


Special Instructions: restrict fluid intake to (1.2L per day)


Additional Instructions: Follow-up with OB/GYN in 1 week for your pelvic 

pathology/simple left ovarian cyst.  Repeat BMP in one week


Follow up with: 


PRIMARY CARE,MD [Primary Care Provider] - 3-5 Days


ROSENDA VIEYRA MD [Staff Physician] - 7 Days

## 2021-02-05 NOTE — CAT SCAN REPORT
CT HEAD/BRAIN WO CON 



INDICATION / CLINICAL INFORMATION:

Fall with head trauma/pain. No loss of consciousness.



TECHNIQUE:

All CT scans at this location are performed using CT dose reduction for ALARA by means of automated e
xposure control. 



COMPARISON:

None available.



FINDINGS:

There is mild generalized cerebral atrophy. No focal lesion or mass effect is seen. There is no evide
nce of intracranial hemorrhage or acute major vessel occlusion.



The calvarium is intact. The visualized paranasal sinuses and mastoid air cells are clear.



IMPRESSION: No acute abnormality. 



Signer Name: Manny Fried MD 

Signed: 2/5/2021 1:58 AM

Workstation Name: [id number]

## 2021-02-05 NOTE — ULTRASOUND REPORT
ULTRASOUND PELVIS  



INDICATION / CLINICAL INFORMATION: 

left ovarian cyst.



TECHNIQUE:

Transabdominal. Transvaginal  

Duplex Color Doppler used: Yes. 



COMPARISON: 

Recent CT abdomen/pelvis 2/3/2021.



FINDINGS:

UTERUS: The uterus is of normal size measuring 8.5 x 5.3 x 5.9 cm. The uterus is slightly anteverted.
 There are 2 small solid masses along the posterior uterine body measuring 1.7 cm and 1.1 cm respecti
vely. These are most likely small fibroids. Endometrial thickness is 6 mm, which is normal.  



RIGHT ADNEXA: No significant ovarian cyst or mass. Normal color Doppler blood flow.



LEFT ADNEXA: There is a large simple cyst arising from the left ovary measuring 5.1 cm in diameter. N
o internal echoes or septations are noted within the cyst. Normal color Doppler blood flow.



URINARY BLADDER: No significant abnormality. 

FREE FLUID: None.

ADDITIONAL FINDINGS: None.



IMPRESSION:

1. 5.1 cm left ovarian simple cyst. No evidence of ovarian torsion.

2. Two small uterine fibroids as described above.



Signer Name: Gianna Rodriguez MD 

Signed: 2/5/2021 8:00 PM

Workstation Name: [id number]

## 2021-02-12 NOTE — XRAY REPORT
CHEST 1 VIEW 2/12/2021 7:23 PM



INDICATION / CLINICAL INFORMATION:

dyspnea.



COMPARISON: 

Chest 2 views from 2/3/2021.



FINDINGS:

Suboptimal positioning and exclusion of the right costophrenic angle limits this exam.



SUPPORT DEVICES: None.

HEART / MEDIASTINUM: Stable. 

LUNGS / PLEURA: Clear lungs. No significant pleural effusion. No pneumothorax. 



ADDITIONAL FINDINGS: No significant additional findings.



IMPRESSION:

1. Limited exam due to suboptimal positioning.

2. Stable cardiomegaly without an additional acute abnormality.



Signer Name: Guy Brannon MD 

Signed: 2/12/2021 8:21 PM

Workstation Name: [id number]

## 2021-02-12 NOTE — HISTORY AND PHYSICAL REPORT
History of Present Illness


Date of examination: 21


Date of admission: 


21 21:52





Chief complaint: 





Shortness of breath, chest pain


History of present illness: 





40-year-old -American female with history of hypertension, ovarian cyst, 

uterine fibroids, tobacco dependence, nonischemic cardiomyopathy, systolic heart

failure with a EF 15 to 20% who presents to [facility] ED with complaints of chest 

fullness ( chest pain) and SOB x 4days.  Patient states her "HF" is in flare, 

because she has chest fullness, shortness of breath with minimal activity, and 

unable to urinate (x2days).  Endorses orthopnea.  Review of medical record shows

patient was admitted approximately 1 week ago () and discharged in  for 

CHF exacerbation.  She was advised to follow-up with her PCP in 3 to 5 days and 

with her cardiologist (Dr. Lyles, [facility]) in 7 days.  Patient states she 

did not follow-up with PCP or cardiologist, but did not give a reason as to why.

 Endorses nausea





Denies diarrhea, fever, headache, palpitation, cough, hemoptysis, hematuria, 

alterations in vision, alterations in gait, lightheadedness, abdominal pain, 

constipation or recent sick contacts





Past History


Past Medical History: other (DM, HTN, ovarian cyst, uterine fibroids, tobacco 

dependence, nonischemic cardiomyopathy, systolic heart failure with EF 15 to 

20%, obesity)


Past Surgical History: Other ("' Ovarian surgery", tubal ligation,  x4)


Social history: single, Lives alone, smoking, full code.  denies: prescription 

drug abuse, IV drug use


Family history: hypertension





Medications and Allergies


                                    Allergies











Allergy/AdvReac Type Severity Reaction Status Date / Time


 


cyclobenzaprine HCl Allergy  Shortness Verified 21 18:48





[From Flexeril]   of Breath  


 


ibuprofen [From Motrin] Allergy  Swelling Verified 21 18:48


 


EPIDURAL MEDS Allergy  Shortness Uncoded 20 17:10





   of Breath  











                                Home Medications











 Medication  Instructions  Recorded  Confirmed  Last Taken  Type


 


Aspirin [Adult Low Dose Aspirin EC] 81 mg PO DAILY #30 tablet. 18  

Unknown Rx


 


Carvedilol [Coreg] 12.5 mg PO BID #60 tablet 18  Unknown Rx


 


Valsartan [Diovan] 160 mg PO BID #60 tablet 18  Unknown Rx


 


Acetaminophen/Codeine [Tylenol 1 tab PO Q6H PRN #12 tab 20  Unknown Rx





/Codeine # 3 tab]     


 


medroxyPROGESTERone ACETATE 10 mg PO QDAY 10 Days #10 tablet 20  Unknown 

Rx





[Provera]     


 


Furosemide [Lasix TAB] 40 mg PO BID #60 tablet 20  Unknown Rx


 


Spironolactone [Aldactone] 25 mg PO QDAY #30 tablet 20  Unknown Rx














Review of Systems


All systems: negative (As noted in HPI)





Exam





- Physical Exam


Narrative exam: 





Physical exam





General appearance: Present: No acute distress, alert and oriented 3, obese, 

skin American female 





- EENT


Eyes: Present: PERRL, EOM intact


ENT: hearing intact, normal dentition





- Neck


Neck: Present: supple, normal ROM





- Respiratory


Respiratory effort: Non-labored


Respiratory: Diminished bases





- Cardiovascular


Heart rate: 115 (bpm)


Rhythm: Sinus tachycardia


Heart Sounds: Present: S1 & S2.  Absent: rub, click





- Extremities


Extremities: no ischemia, pulses intact,, bilateral lower extremity trace edema





- Peripheral Assessment


Peripheral Pulses: within normal limits


 


- Abdominal


General gastrointestinal: Obese, soft, non-tender, normal bowel sounds





- Integumentary


Integumentary: Present: warm, dry





- Musculoskeletal


Musculoskeletal: Able to move all extremities





-Neurological


Neurological: CN II-XII intact





- Psychiatric


Psychiatric: cooperative





- Constitutional


Vitals: 


                                        











Temp Pulse Resp BP Pulse Ox


 


 98.6 F   86   16   134/89   96 


 


 21 18:51  21 22:15  21 22:15  21 22:15  21 22:15














HEART Score





- HEART Score


History: Slightly suspicious


EKG: Non-specific


Age: < 45


Risk factors: 1-2 risk factors


Troponin: 


                                        











Troponin T  0.079 ng/mL (0.00-0.029)  H  21  19:21    











Troponin: < normal limit


HEART Score: 2





Results





- Labs


CBC & Chem 7: 


                                 21 19:21





                                 21 19:21


Labs: 


                             Laboratory Last Values











WBC  5.5 K/mm3 (4.5-11.0)   21  19:21    


 


RBC  4.23 M/mm3 (3.65-5.03)   21  19:21    


 


Hgb  9.0 gm/dl (10.1-14.3)  L  21  19:21    


 


Hct  29.5 % (30.3-42.9)  L  21  19:21    


 


MCV  70 fl (79-97)  L  21  19:21    


 


MCH  21 pg (28-32)  L  21:    


 


MCHC  31 % (30-34)   21  19:    


 


RDW  20.3 % (13.2-15.2)  H  21  19:21    


 


Plt Count  231 K/mm3 (140-440)   21  19:21    


 


Lymph % (Auto)  42.9 % (13.4-35.0)  H  21  19:21    


 


Mono % (Auto)  5.8 % (0.0-7.3)   21  19:21    


 


Eos % (Auto)  0.8 % (0.0-4.3)   21:    


 


Baso % (Auto)  1.5 % (0.0-1.8)   21  19:21    


 


Lymph # (Auto)  2.4 K/mm3 (1.2-5.4)   21  19:    


 


Mono # (Auto)  0.3 K/mm3 (0.0-0.8)   21  19:21    


 


Eos # (Auto)  0.0 K/mm3 (0.0-0.4)   21:    


 


Baso # (Auto)  0.1 K/mm3 (0.0-0.1)   21  19:    


 


Seg Neutrophils %  49.0 % (40.0-70.0)   21  19:    


 


Seg Neutrophils #  2.7 K/mm3 (1.8-7.7)   21  19:21    


 


Sodium  132 mmol/L (137-145)  L  21  19:21    


 


Potassium  4.5 mmol/L (3.6-5.0)   21  19:21    


 


Chloride  99.2 mmol/L ()   21  19:21    


 


Carbon Dioxide  24 mmol/L (22-30)   21  19:21    


 


Anion Gap  13 mmol/L  21  19:21    


 


BUN  15 mg/dL (7-17)   21  19:21    


 


Creatinine  1.1 mg/dL (0.6-1.2)   21  19:21    


 


Estimated GFR  > 60 ml/min  21  19:21    


 


BUN/Creatinine Ratio  14 %  21  19:21    


 


Glucose  101 mg/dL ()  H  21  19:    


 


Calcium  8.7 mg/dL (8.4-10.2)   21  19:    


 


Total Bilirubin  1.30 mg/dL (0.1-1.2)  H  21  19:21    


 


AST  25 units/L (5-40)   21:    


 


ALT  31 units/L (7-56)   21  19:    


 


Alkaline Phosphatase  74 units/L ()   21  19:21    


 


Total Creatine Kinase  Cancelled   21  19:21    


 


CK-MB (CK-2)  Cancelled   21  19:21    


 


CK-MB (CK-2) Rel Index  Cancelled   21  19:21    


 


Troponin T  0.079 ng/mL (0.00-0.029)  H  21  19:21    


 


NT-Pro-B Natriuret Pep  7279 pg/mL (0-450)  H  21  19:21    


 


Total Protein  6.6 g/dL (6.3-8.2)   21  19:    


 


Albumin  3.2 g/dL (3.9-5)  L  21  19:21    


 


Albumin/Globulin Ratio  0.9 %  21  19:21    


 


Triglycerides  55 mg/dL (2-149)   21  19:21    


 


Cholesterol  76 mg/dL ()   21  19:    


 


LDL Cholesterol Direct  49 mg/dL ()  L  21  19:21    


 


HDL Cholesterol  24 mg/dL (40-59)  L  21  19:21    


 


Cholesterol/HDL Ratio  3.16 %  21  19:21    


 


Urine Color  Straw  (Yellow)   21  Unknown


 


Urine Turbidity  Clear  (Clear)   21  Unknown


 


Urine pH  7.0  (5.0-7.0)   21  Unknown


 


Ur Specific Gravity  1.006  (1.003-1.030)   21  Unknown


 


Urine Protein  <15 mg/dl mg/dL (Negative)   21  Unknown


 


Urine Glucose (UA)  Neg mg/dL (Negative)   21  Unknown


 


Urine Ketones  Neg mg/dL (Negative)   21  Unknown


 


Urine Blood  Neg  (Negative)   21  Unknown


 


Urine Nitrite  Neg  (Negative)   21  Unknown


 


Urine Bilirubin  Neg  (Negative)   21  Unknown


 


Urine Urobilinogen  < 2.0 mg/dL (<2.0)   21  Unknown


 


Ur Leukocyte Esterase  Neg  (Negative)   21  Unknown


 


Urine WBC (Auto)  < 1.0 /HPF (0.0-6.0)   21  Unknown


 


Urine RBC (Auto)  1.0 /HPF (0.0-6.0)   21  Unknown


 


U Epithel Cells (Auto)  1.0 /HPF (0-13.0)   21  Unknown


 


Urine Bacteria (Auto)  1+ /HPF (Negative)   21  Unknown


 


Urine Mucus  Few /HPF  21  Unknown














- Imaging and Cardiology


Imaging and Cardiology: 





CXR:


CHEST 1 VIEW 2021 7:23 PM


INDICATION / CLINICAL INFORMATION:


dyspnea.


COMPARISON:


Chest 2 views from 2/3/2021.


FINDINGS:


Suboptimal positioning and exclusion of the right costophrenic angle limits this

 exam.


SUPPORT DEVICES: None.


HEART / MEDIASTINUM: Stable.


LUNGS / PLEURA: Clear lungs. No significant pleural effusion. No pneumothorax.


ADDITIONAL FINDINGS: No significant additional findings.





IMPRESSION:


1. Limited exam due to suboptimal positioning.


2. Stable cardiomegaly without an additional acute abnormality. 





Assessment and Plan


Assessment and plan: 











Acute Exacerbation Systolic HF 


-EF  15-20% seen on Echo 2020


-BNP elevated at 7279


-Troponin negative 1, will trend


-CXR shows stable cardiomegaly without an additional acute abnormality


-Continue IV Lasix , ARB, BB, ASA


-Cardiology consulted








Acute atypical chest pain


-Per pt Described as chest fullness


-Initiate chest pain protocol


-Continuous telemetry monitoring


-Continue supportive care


-Pain mgmt


-Troponin negative x1 , trending


-EKG unrevealing for acute ischemic abnormalities


-Cardiology consulted





Dyspnea


-Likely due to heart failure


-Monitor sats


-Albuterol as needed





HTN


-Monitor BP


-Resume home hypertensive meds 





Tobacco dependence


-Current smoker


-Counseled for cessation


-Nicotine patch when necessary





Anemia 


-Hemoglobin on admission 9.0


-No s/s of active bleeding


-Continue to monitor hemoglobin


-Transfuse as needed for hgb<7











DVT PPX


-On Lovenox
































Advance Directives: No


VTE prophylaxis?: Chemical, Mechanical


Plan of care discussed with patient/family: Yes

## 2021-02-12 NOTE — EVENT NOTE
ED Screening Note


Date of service: 02/12/21


Time: 18:55


ED Screening Note: 


30-year-old female with a history of CHF presents with shortness of breath and 

chest tightness





In a wheelchair, laboring





This initial assessment/diagnostic orders/clinical plan/treatment(s) is/are 

subject to change based on patients health status, clinical progression and re-

assessment by fellow clinical providers in the ED. Further treatment and workup 

at subsequent clinical providers discretion. Patient/guardian urged not to elope

from the ED as their condition may be serious if not clinically assessed and 

managed. 





Initial orders include: 


labs, ekg,

## 2021-02-12 NOTE — EMERGENCY DEPARTMENT REPORT
ED Chest Pain HPI





- General


Chief Complaint: Dyspnea/Respdistress


Stated Complaint: CHEST PAIN/SRIKANTH


PUI?: No


Time Seen by Provider: 21 19:10


Source: patient, EMS


Mode of arrival: Wheelchair


Limitations: No Limitations





- History of Present Illness


Initial Comments: 





Chief complaint: "Y'all keep telling me that it should get better.  It does not 

get better.  I am still full in my chest."





HPI: This is a 40-year-old female with history of HTN, nonischemic dilated 

cardiomyopathy, ovarian cysts, uterine fibroids, severe hypertension, tobacco 

dependence, systolic heart failure ejection fraction 15-20% who presents with 

shortness of breath and chest fullness for 4 days.  She has severe shortness of 

breath.  Patient states that she has not urinated in 2 days.


MD Complaint: chest pain


-: Gradual, days(s) (4)


Onset: during rest, during exertion


Pain Location: substernal


Pain Radiation: RUE


Severity: moderate


Severity scale (0 -10): 5


Quality: dull


Consistency: constant


Improves With: nothing


Worsens With: exertion


re: dyspnea


Treatments Prior to Arrival: other (EMS transport)





- Related Data


                                  Previous Rx's











 Medication  Instructions  Recorded  Last Taken  Type


 


Aspirin [Adult Low Dose Aspirin EC] 81 mg PO DAILY #30 tablet. 18 

Unknown Rx


 


Carvedilol [Coreg] 12.5 mg PO BID #60 tablet 18 Unknown Rx


 


Valsartan [Diovan] 160 mg PO BID #60 tablet 18 Unknown Rx


 


Acetaminophen/Codeine [Tylenol 1 tab PO Q6H PRN #12 tab 20 Unknown Rx





/Codeine # 3 tab]    


 


medroxyPROGESTERone ACETATE 10 mg PO QDAY 10 Days #10 tablet 20 Unknown Rx





[Provera]    


 


Furosemide [Lasix TAB] 40 mg PO BID #60 tablet 20 Unknown Rx


 


Spironolactone [Aldactone] 25 mg PO QDAY #30 tablet 20 Unknown Rx











                                    Allergies











Allergy/AdvReac Type Severity Reaction Status Date / Time


 


cyclobenzaprine HCl Allergy  Shortness Verified 21 18:48





[From Flexeril]   of Breath  


 


ibuprofen [From Motrin] Allergy  Swelling Verified 21 18:48


 


EPIDURAL MEDS Allergy  Shortness Uncoded 20 17:10





   of Breath  














Heart Score





- HEART Score


History: Slightly suspicious


EKG: Non-specific


Age: < 45


Risk factors: 1-2 risk factors


Troponin: < normal limit


HEART Score: 2





ED Review of Systems


ROS: 


Stated complaint: CHEST PAIN/SRIKANTH


Other details as noted in HPI





Comment: All other systems reviewed and negative


Constitutional: denies: chills, fever, malaise


Respiratory: see HPI, shortness of breath.  denies: cough


Cardiovascular: chest pain


Gastrointestinal: denies: abdominal pain, nausea, vomiting, diarrhea


Genitourinary: denies: urgency, dysuria


Skin: denies: rash, lesions


Neurological: denies: headache, weakness





ED Past Medical Hx





- Past Medical History


Previous Medical History?: Yes


Hx Hypertension: Yes


Hx CVA: No


Hx Heart Attack/AMI: No


Hx Congestive Heart Failure: Yes


Hx Diabetes: No


Hx Deep Vein Thrombosis: No


Hx Pulmonary Embolism: No


Hx GERD: No


Hx Liver Disease: No


Hx Renal Disease: No


Hx Sickle Cell Disease: No


Hx Arthritis: No


Hx Headaches / Migraines: No


Hx Seizures: No


Hx Kidney Stones: No


Hx Psychiatric Treatment: No


Hx Asthma: No


Hx COPD: No


Hx Tuberculosis: No


Hx Dementia: No


Hx HIV: No





- Surgical History


Past Surgical History?: Yes


Hx Coronary Stent: No


Hx Open Heart Surgery: No


Hx Pacemaker: No


Hx Internal Defibrillator: No


Hx Cholecystectomy: Yes


Hx Appendectomy: No


Hx Breast Surgery: No


Additional Surgical History: "Ovarian surgery",.   x 4.  TUBAL LIGATION





- Social History


Smoking Status: Never Smoker


Substance Use Type: None





- Medications


Home Medications: 


                                Home Medications











 Medication  Instructions  Recorded  Confirmed  Last Taken  Type


 


Aspirin [Adult Low Dose Aspirin EC] 81 mg PO DAILY #30 tablet. 18  

Unknown Rx


 


Carvedilol [Coreg] 12.5 mg PO BID #60 tablet 18  Unknown Rx


 


Valsartan [Diovan] 160 mg PO BID #60 tablet 18  Unknown Rx


 


Acetaminophen/Codeine [Tylenol 1 tab PO Q6H PRN #12 tab 20  Unknown Rx





/Codeine # 3 tab]     


 


medroxyPROGESTERone ACETATE 10 mg PO QDAY 10 Days #10 tablet 20  Unknown 

Rx





[Provera]     


 


Furosemide [Lasix TAB] 40 mg PO BID #60 tablet 20  Unknown Rx


 


Spironolactone [Aldactone] 25 mg PO QDAY #30 tablet 20  Unknown Rx














ED Physical Exam





- General


Limitations: No Limitations


General appearance: alert, in no apparent distress, other (speaking full words 

sentences)





- Head


Head exam: Present: atraumatic, normocephalic





- Eye


Eye exam: Present: normal appearance





- ENT


ENT exam: Present: mucous membranes moist





- Neck


Neck exam: Present: normal inspection, full ROM





- Respiratory


Respiratory exam: Present: other (increased respiratory rate).  Absent: wheezes,

 rales, rhonchi





- Cardiovascular


Cardiovascular Exam: Present: regular rate, normal rhythm, normal heart sounds. 

 Absent: systolic murmur, diastolic murmur, rubs, gallop





- GI/Abdominal


GI/Abdominal exam: Present: soft, normal bowel sounds.  Absent: distended, 

tenderness, guarding, rebound





- Extremities Exam


Extremities exam: Present: normal inspection





- Neurological Exam


Neurological exam: Present: alert, oriented X3





- Psychiatric


Psychiatric exam: Present: normal affect, normal mood





- Skin


Skin exam: Present: warm, dry, intact, normal color.  Absent: rash





ED Course


                                   Vital Signs











  21





  18:51 19:15 19:25


 


Temperature 98.6 F  


 


Pulse Rate 86 112 H 109 H


 


Respiratory 32 H 16 





Rate   


 


Blood Pressure 181/116 170/130 170/103


 


O2 Sat by Pulse 100 100 





Oximetry   














  21





  19:26 21:00 21:15


 


Temperature   


 


Pulse Rate 109 H 90 87


 


Respiratory  21 20





Rate   


 


Blood Pressure 170/103 130/98 125/93


 


O2 Sat by Pulse  99 99





Oximetry   














  21





  21:30 21:36


 


Temperature  


 


Pulse Rate 89 89


 


Respiratory 22 





Rate  


 


Blood Pressure 170/130 125/93


 


O2 Sat by Pulse 96 





Oximetry  














JEFF score





- Jeff Score


Age > 65: (0) No


Aspirin use within the Past 7 Days: (0) No


3 or more CAD Risk Factors: (0) No


2 or more Angina events in past 24 hrs: (0) No


Known CAD with more than 50% Stenosis: (0) No


Elevated Cardiac Markers: (0) No


ST Deviation Greater than 0.5mm: (0) No


JEFF Score: 0





ED Medical Decision Making





- Lab Data


Result diagrams: 


                                 21 19:21





                                 21 19:21





- EKG Data


-: EKG Interpreted by Me


EKG shows normal: sinus rhythm, axis


Rate: tachycardia





- EKG Data





21 19:37


EKG obtained 1843


Sinus tachycardia 115 bpm nl axis prolonged QTC LVH no ST elevation





- Radiology Data


Radiology results: report reviewed, image reviewed





CHEST 1 VIEW 2021 7:23 PM


INDICATION / CLINICAL INFORMATION:


dyspnea.


COMPARISON:


Chest 2 views from 2/3/2021.


FINDINGS:


Suboptimal positioning and exclusion of the right costophrenic angle limits this

 exam.


SUPPORT DEVICES: None.


HEART / MEDIASTINUM: Stable.


LUNGS / PLEURA: Clear lungs. No significant pleural effusion. No pneumothorax.


ADDITIONAL FINDINGS: No significant additional findings.


IMPRESSION:


1. Limited exam due to suboptimal positioning.


2. Stable cardiomegaly without an additional acute abnormality. 





- Medical Decision Making





1.  Acute heart failure: Shortness of breath chest pain due to poor management 

of heart failure due to medication noncompliance.  Patient did have significant 

diuresis with IV Lasix.  Blood pressure was easily controlled with p.o. 

medications.  Patient is admitted to the hospital service for further treatment 

evaluation.





I have reviewed labs which revealed significantly elevated BNP. Which is higher 

than value obtained during her hospital lesion earlier this month.  Troponin 

chronically elevated value largely unchanged actually downtrending.  Kidney 

function has improved since recent discharge.


Critical care attestation.: 


If time is entered above; I have spent that time in minutes in the direct care 

of this critically ill patient, excluding procedure time.








ED Disposition


Clinical Impression: 


 CHF (congestive heart failure), CHF exacerbation, Malignant hypertension, Acute

 on chronic systolic heart failure





Disposition:  OP ADMIT IP TO THIS HOSP


Is pt being admited?: Yes


Does the pt Need Aspirin: No


Condition: Stable


Instructions:  Hypertension (ED)


Referrals: 


ERINNE,SAMUEL [Other] - 3-5 Days

## 2021-02-13 NOTE — PROGRESS NOTE
Assessment and Plan


Assessment and plan: 





Acute Exacerbation Systolic HF 


-EF  15-20% seen on Echo 11/2020


-BNP elevated at 7279


-Troponin negative 1, will trend


-CXR shows stable cardiomegaly without an additional acute abnormality


-Continue IV Lasix , ARB, BB, ASA


-Cardiology consulted








Acute atypical chest pain


-Per pt Described as chest fullness


-Initiate chest pain protocol


-Continuous telemetry monitoring


-Continue supportive care


-Pain mgmt


-Troponin negative x1 , trending


-EKG unrevealing for acute ischemic abnormalities


-Cardiology consulted





Dyspnea


-Likely due to heart failure


-Monitor sats


-Albuterol as needed





HTN


-Monitor BP


-Resume home hypertensive meds 





Tobacco dependence


-Current smoker


-Counseled for cessation


-Nicotine patch when necessary





Anemia 


-Hemoglobin on admission 9.0


-No s/s of active bleeding


-Continue to monitor hemoglobin


-Transfuse as needed for hgb<7





2/13/2021


-Patient is on IV Lasix and guideline directed treatment for CHF


-Cardiology consulted and follow recommendation


-Hemoglobin is stable


-Patient is on heparin drip for non-STEMI





History


Interval history: 





Patient was seen and evaluated this morning


Patient is still complaining shortness of breath and chest pain





Hospitalist Physical





- Physical exam


Narrative exam: 





 Not in cardiopulmonary distress. 


 The patient appeared well nourished and normally developed.


 Vital signs as documented.


 Head exam is unremarkable.


 No scleral icterus .


 Neck is without jugular venous distension, thyromegaly, or carotid bruits. 


 Lungs are clear to auscultation.


Cardiac exam reveals regular rate and  Rhythm. 


Abdominal exam reveals normal bowel sounds, nontender, no organomegaly.


Extremities are nonedematous and both femoral and pedal pulses are normal.


CNS: Alert and oriented 3.  No focal weakness.





- Constitutional


Vitals: 


                                        











Temp Pulse Resp BP Pulse Ox


 


 98.2 F   82   18   122/53   94 


 


 02/13/21 04:15  02/13/21 05:50  02/13/21 04:15  02/13/21 05:50  02/13/21 04:15














HEART Score





- HEART Score


EKG: Non-specific


Age: < 45


Risk factors: 1-2 risk factors


Troponin: 


                                        











Troponin T  0.076 ng/mL (0.00-0.029)  H  02/13/21  00:35    











Troponin: < normal limit





Results





- Labs


CBC & Chem 7: 


                                 02/13/21 07:01





                                 02/12/21 19:21


Labs: 


                             Laboratory Last Values











WBC  5.5 K/mm3 (4.5-11.0)   02/12/21  19:21    


 


RBC  4.23 M/mm3 (3.65-5.03)   02/12/21  19:21    


 


Hgb  8.4 gm/dl (10.1-14.3)  L  02/13/21  07:01    


 


Hct  27.1 % (30.3-42.9)  L  02/13/21  07:01    


 


MCV  70 fl (79-97)  L  02/12/21  19:21    


 


MCH  21 pg (28-32)  L  02/12/21  19:21    


 


MCHC  31 % (30-34)   02/12/21  19:21    


 


RDW  20.3 % (13.2-15.2)  H  02/12/21  19:21    


 


Plt Count  221 K/mm3 (140-440)   02/13/21  07:01    


 


Lymph % (Auto)  42.9 % (13.4-35.0)  H  02/12/21  19:21    


 


Mono % (Auto)  5.8 % (0.0-7.3)   02/12/21  19:21    


 


Eos % (Auto)  0.8 % (0.0-4.3)   02/12/21  19:21    


 


Baso % (Auto)  1.5 % (0.0-1.8)   02/12/21  19:21    


 


Lymph # (Auto)  2.4 K/mm3 (1.2-5.4)   02/12/21  19:21    


 


Mono # (Auto)  0.3 K/mm3 (0.0-0.8)   02/12/21  19:21    


 


Eos # (Auto)  0.0 K/mm3 (0.0-0.4)   02/12/21  19:21    


 


Baso # (Auto)  0.1 K/mm3 (0.0-0.1)   02/12/21  19:21    


 


Seg Neutrophils %  49.0 % (40.0-70.0)   02/12/21  19:21    


 


Seg Neutrophils #  2.7 K/mm3 (1.8-7.7)   02/12/21  19:21    


 


PT  16.6 Sec. (12.2-14.9)  H  02/13/21  07:01    


 


INR  1.36  (0.87-1.13)  H  02/13/21  07:01    


 


APTT  33.4 Sec. (24.2-36.6)   02/13/21  07:01    


 


Sodium  132 mmol/L (137-145)  L  02/12/21  19:21    


 


Potassium  4.5 mmol/L (3.6-5.0)   02/12/21  19:21    


 


Chloride  99.2 mmol/L ()   02/12/21  19:21    


 


Carbon Dioxide  24 mmol/L (22-30)   02/12/21  19:21    


 


Anion Gap  13 mmol/L  02/12/21  19:21    


 


BUN  15 mg/dL (7-17)   02/12/21  19:21    


 


Creatinine  1.1 mg/dL (0.6-1.2)   02/12/21  19:21    


 


Estimated GFR  > 60 ml/min  02/12/21  19:21    


 


BUN/Creatinine Ratio  14 %  02/12/21  19:21    


 


Glucose  101 mg/dL ()  H  02/12/21  19:21    


 


Calcium  8.7 mg/dL (8.4-10.2)   02/12/21  19:21    


 


Total Bilirubin  1.30 mg/dL (0.1-1.2)  H  02/12/21  19:21    


 


AST  25 units/L (5-40)   02/12/21  19:21    


 


ALT  31 units/L (7-56)   02/12/21  19:21    


 


Alkaline Phosphatase  74 units/L ()   02/12/21  19:21    


 


Total Creatine Kinase  Cancelled   02/12/21  19:21    


 


CK-MB (CK-2)  Cancelled   02/12/21  19:21    


 


CK-MB (CK-2) Rel Index  Cancelled   02/12/21  19:21    


 


Troponin T  0.076 ng/mL (0.00-0.029)  H  02/13/21  00:35    


 


NT-Pro-B Natriuret Pep  7279 pg/mL (0-450)  H  02/12/21  19:21    


 


Total Protein  6.6 g/dL (6.3-8.2)   02/12/21  19:21    


 


Albumin  3.2 g/dL (3.9-5)  L  02/12/21  19:21    


 


Albumin/Globulin Ratio  0.9 %  02/12/21  19:21    


 


Triglycerides  55 mg/dL (2-149)   02/12/21  19:21    


 


Cholesterol  76 mg/dL ()   02/12/21  19:21    


 


LDL Cholesterol Direct  49 mg/dL ()  L  02/12/21  19:21    


 


HDL Cholesterol  24 mg/dL (40-59)  L  02/12/21  19:21    


 


Cholesterol/HDL Ratio  3.16 %  02/12/21  19:21    


 


Urine Color  Straw  (Yellow)   02/12/21  Unknown


 


Urine Turbidity  Clear  (Clear)   02/12/21  Unknown


 


Urine pH  7.0  (5.0-7.0)   02/12/21  Unknown


 


Ur Specific Gravity  1.006  (1.003-1.030)   02/12/21  Unknown


 


Urine Protein  <15 mg/dl mg/dL (Negative)   02/12/21  Unknown


 


Urine Glucose (UA)  Neg mg/dL (Negative)   02/12/21  Unknown


 


Urine Ketones  Neg mg/dL (Negative)   02/12/21  Unknown


 


Urine Blood  Neg  (Negative)   02/12/21  Unknown


 


Urine Nitrite  Neg  (Negative)   02/12/21  Unknown


 


Urine Bilirubin  Neg  (Negative)   02/12/21  Unknown


 


Urine Urobilinogen  < 2.0 mg/dL (<2.0)   02/12/21  Unknown


 


Ur Leukocyte Esterase  Neg  (Negative)   02/12/21  Unknown


 


Urine WBC (Auto)  < 1.0 /HPF (0.0-6.0)   02/12/21  Unknown


 


Urine RBC (Auto)  1.0 /HPF (0.0-6.0)   02/12/21  Unknown


 


U Epithel Cells (Auto)  1.0 /HPF (0-13.0)   02/12/21  Unknown


 


Urine Bacteria (Auto)  1+ /HPF (Negative)   02/12/21  Unknown


 


Urine Mucus  Few /HPF  02/12/21  Unknown











Drake/IV: 


                                        





Voiding Method                   Toilet











Active Medications





- Current Medications


Current Medications: 














Generic Name Dose Route Start Last Admin





  Trade Name Freq  PRN Reason Stop Dose Admin


 


Albuterol  2.5 mg  02/12/21 23:00 





  Albuterol 2.5 Mg/3 Ml Nebu  IH  





  Q4HRT PRN  





  Shortness Of Breath  


 


Aspirin  81 mg  02/13/21 10:00 





  Aspirin Ec 81 Mg Tab  PO  





  DAILY YUNIEL  


 


Carvedilol  12.5 mg  02/13/21 10:00 





  Carvedilol 12.5 Mg Tab  PO  





  BID [facility]  


 


Furosemide  40 mg  02/13/21 06:00  02/13/21 05:44





  Furosemide 40 Mg/4 Ml Inj  IV   40 mg





  BID@0600,1800 [facility]   Administration


 


Heparin Sodium/Sodium Chloride  25,000 unit in 500 mls @ 20 mls/hr  02/13/21 

08:00 





  Heparin/ 0.45% Nacl-25,000 Unit/500 Ml  IV  





  TITRATE [facility]  





  Protocol  





  1,000 UNITS/HR  


 


Nitroglycerin  0.4 mg  02/12/21 22:57  02/13/21 05:50





  Nitroglycerin 0.4 Mg Tab Subl  SL   0.4 mg





  .Q5MIN PRN   Administration





  Chest Pain  


 


Potassium Chloride  10 meq  02/13/21 10:00 





  Potassium Chloride Er 10 Meq Tab  PO  02/15/21 10:01 





  QDAY [facility]  


 


Valsartan  160 mg  02/13/21 10:00 





  Valsartan 160mg Tab  PO  





  BID [facility]

## 2021-02-13 NOTE — CONSULTATION
History of Present Illness


Consult date: 21


Consult reason: congestive heart failure


History of present illness: 





Patient is a 40-year-old woman with a history of dilated nonischemic 

cardiomyopathy and chronic systolic heart failure dating back to at least . 

In 2017, cardiac catheterization demonstrated normal coronary arteries, with 

left ventricular ejection fraction 20%.  LV function has remained severely 

impaired, most recent echocardiogram 2 months ago reported an ejection fraction 

persistently depressed at 15 to 20%.  The patient has an admitted history of po

or compliance with medical therapy and with outpatient cardiology office visits.

 As a result, there has been recurrent admissions for heart failure 

exacerbation, and no opportunities to advance further therapies including 

primary device therapies.





She is admitted to the hospital at this time with increasing shortness of 

breath, fatigue and clinical evidence of decompensated heart failure.  On pr

esentation, her systolic blood pressure was 180.  EKG is normal sinus rhythm, 

left ventricular hypertrophy with repolarization abnormalities of LVH.  Chest x-

ray reveals massive cardiomegaly, but with minimal to no significant 

interstitial edema.  She reports that she had a negative COVID-19 test in the 

few days prior to her hospital presentation.





Past History


Past Medical History: heart failure, hypertension, other (DM, HTN, ovarian cyst,

uterine fibroids, tobacco dependence, nonischemic cardiomyopathy, systolic heart

failure with EF 15 to 20%, obesity)


Past Surgical History: Other ("' Ovarian surgery", tubal ligation,  x4)


Social history: single, Lives alone, smoking, full code.  denies: prescription 

drug abuse, IV drug use


Family history: hypertension





Medications and Allergies


                                    Allergies











Allergy/AdvReac Type Severity Reaction Status Date / Time


 


cyclobenzaprine HCl Allergy  Shortness Verified 21 18:48





[From Flexeril]   of Breath  


 


ibuprofen [From Motrin] Allergy  Swelling Verified 21 18:48


 


EPIDURAL MEDS Allergy  Shortness Uncoded 20 17:10





   of Breath  











                                Home Medications











 Medication  Instructions  Recorded  Confirmed  Last Taken  Type


 


Aspirin [Adult Low Dose Aspirin EC] 81 mg PO DAILY #30 tablet. 18 Unknown Rx


 


Carvedilol [Coreg] 12.5 mg PO BID #60 tablet 18 Unknown Rx


 


Valsartan [Diovan] 160 mg PO BID #60 tablet 18 Unknown Rx


 


Acetaminophen/Codeine [Tylenol 1 tab PO Q6H PRN #12 tab 20 

Unknown Rx





/Codeine # 3 tab]     


 


medroxyPROGESTERone ACETATE 10 mg PO QDAY 10 Days #10 tablet 20 

Unknown Rx





[Provera]     


 


Furosemide [Lasix TAB] 40 mg PO BID #60 tablet 20 Unknown Rx


 


Spironolactone [Aldactone] 25 mg PO QDAY #30 tablet 20 Unknown Rx











Active Meds: 


Active Medications





Albuterol (Albuterol 2.5 Mg/3 Ml Nebu)  2.5 mg IH Q4HRT PRN


   PRN Reason: Shortness Of Breath


Aspirin (Aspirin Ec 81 Mg Tab)  81 mg PO DAILY [facility]


   Last Admin: 21 11:37 Dose:  81 mg


   Documented by: 


Carvedilol (Carvedilol 12.5 Mg Tab)  12.5 mg PO BID [facility]


   Last Admin: 21 11:36 Dose:  12.5 mg


   Documented by: 


Furosemide (Furosemide 40 Mg/4 Ml Inj)  40 mg IV BID@0600,1800 [facility]


   Last Admin: 21 05:44 Dose:  40 mg


   Documented by: 


Heparin Sodium/Sodium Chloride (Heparin/ 0.45% Nacl-25,000 Unit/500 Ml)  25,000 

unit in 500 mls @ 20 mls/hr IV TITRATE [facility]; Protocol


   Last Admin: 21 12:03 Dose:  1,000 units/hr, 20 mls/hr


   Documented by: 


Morphine Sulfate (Morphine 2 Mg/1 Ml Inj)  2 mg IV Q4H PRN


   PRN Reason: Pain, Moderate (4-6)


Nitroglycerin (Nitroglycerin 0.4 Mg Tab Subl)  0.4 mg SL .Q5MIN PRN


   PRN Reason: Chest Pain


   Last Admin: 21 05:50 Dose:  0.4 mg


   Documented by: 


Potassium Chloride (Potassium Chloride Er 10 Meq Tab)  10 meq PO QDAY [facility]


   Stop: 02/15/21 10:01


   Last Admin: 21 11:36 Dose:  10 meq


   Documented by: 


Valsartan (Valsartan 160mg Tab)  160 mg PO BID [facility]


   Last Admin: 21 11:37 Dose:  160 mg


   Documented by: 











Review of Systems


Cardiovascular: orthopnea, edema, lightheadedness, shortness of breath, dyspnea 

on exertion, decreased exercise tolerance, no chest pain, no palpitations, no 

rapid/irregular heart beat, no syncope





Physical Examination


                                   Vital Signs











Temp Pulse Resp BP Pulse Ox


 


 98.6 F   86   32 H  181/116   100 


 


 21 18:51  21 18:51  21 18:51  21 18:51  21 18:51











General appearance: no acute distress


HEENT: Positive: PERRL


Neck: Positive: neck supple


Cardiac: Positive: Reg Rate and Rhythm


Lungs: Positive: Decreased Breath Sounds


Neuro: Positive: Grossly Intact


Abdomen: Positive: Soft


Female genitourinary: deferred


Skin: Positive: Clear


Extremities: Present: +1 Edema





Results





                                 21 07:01





                                 21 19:21


                                 Cardiac Enzymes











  21 Range/Units





  19:21 19:21 


 


AST  25   (5-40)  units/L


 


CK-MB (CK-2)   Cancelled  








                                   Coagulation











  21 Range/Units





  07:01 08:46 


 


PT  16.6 H  17.1 H  (12.2-14.9)  Sec.


 


INR  1.36 H  1.41 H  (0.87-1.13)  


 


APTT  33.4  33.6  (24.2-36.6)  Sec.








                                     Lipids











  21 Range/Units





  19:21 


 


Triglycerides  55  (2-149)  mg/dL


 


Cholesterol  76  ()  mg/dL


 


HDL Cholesterol  24 L  (40-59)  mg/dL


 


Cholesterol/HDL Ratio  3.16  %








                                       CBC











  21 Range/Units





  19:21 07:01 


 


WBC  5.5   (4.5-11.0)  K/mm3


 


RBC  4.23   (3.65-5.03)  M/mm3


 


Hgb  9.0 L  8.4 L  (10.1-14.3)  gm/dl


 


Hct  29.5 L  27.1 L  (30.3-42.9)  %


 


Plt Count  231  221  (140-440)  K/mm3


 


Lymph # (Auto)  2.4   (1.2-5.4)  K/mm3


 


Mono # (Auto)  0.3   (0.0-0.8)  K/mm3


 


Eos # (Auto)  0.0   (0.0-0.4)  K/mm3


 


Baso # (Auto)  0.1   (0.0-0.1)  K/mm3








                          Comprehensive Metabolic Panel











  21 Range/Units





  19:21 


 


Sodium  132 L  (137-145)  mmol/L


 


Potassium  4.5  (3.6-5.0)  mmol/L


 


Chloride  99.2  ()  mmol/L


 


Carbon Dioxide  24  (22-30)  mmol/L


 


BUN  15  (7-17)  mg/dL


 


Creatinine  1.1  (0.6-1.2)  mg/dL


 


Glucose  101 H  ()  mg/dL


 


Calcium  8.7  (8.4-10.2)  mg/dL


 


AST  25  (5-40)  units/L


 


ALT  31  (7-56)  units/L


 


Alkaline Phosphatase  74  ()  units/L


 


Total Protein  6.6  (6.3-8.2)  g/dL


 


Albumin  3.2 L  (3.9-5)  g/dL














EKG interpretations





- Telemetry


EKG Rhythm: Sinus Rhythm





Assessment and Plan





- Patient Problems


(1) Acute on chronic systolic heart failure


Current Visit: Yes   Status: Acute   


Plan to address problem: 


We will place the patient on optimal guideline directed medical therapy, 

including a trial of intravenous milrinone.

## 2021-02-14 NOTE — PROGRESS NOTE
Assessment and Plan


Assessment and plan: 





Acute Exacerbation Systolic HF 


-EF  15-20% seen on Echo 11/2020


-BNP elevated at 7279


-Troponin negative 1, will trend


-CXR shows stable cardiomegaly without an additional acute abnormality


-Continue IV Lasix , ARB, BB, ASA


-Cardiology consulted








Acute atypical chest pain


-Per pt Described as chest fullness


-Initiate chest pain protocol


-Continuous telemetry monitoring


-Continue supportive care


-Pain mgmt


-Troponin negative x1 , trending


-EKG unrevealing for acute ischemic abnormalities


-Cardiology consulted





Dyspnea


-Likely due to heart failure


-Monitor sats


-Albuterol as needed





HTN


-Monitor BP


-Resume home hypertensive meds 





Tobacco dependence


-Current smoker


-Counseled for cessation


-Nicotine patch when necessary





Anemia 


-Hemoglobin on admission 9.0


-No s/s of active bleeding


-Continue to monitor hemoglobin


-Transfuse as needed for hgb<7





2/13/2021


-Patient is on IV Lasix and guideline directed treatment for CHF


-Cardiology consulted and follow recommendation


-Hemoglobin is stable


-Patient is on heparin drip for non-STEMI





2/14/2021


-Cardiology evaluated the patient and put on optimal guideline directed medical 

therapy for CHF


-Patient is on milrinone drip


-Heparin changed to prophylaxis therapy by cardiology


-Patient is still complaining chest pain and will continue with as needed 

morphine.





History


Interval history: 





Patient was seen and evaluated this morning


Patient is still complaining chest pain








Hospitalist Physical





- Physical exam


Narrative exam: 





 Not in cardiopulmonary distress. 


 The patient appeared well nourished and normally developed.


 Vital signs as documented.


 Head exam is unremarkable.


 No scleral icterus .


 Neck is without jugular venous distension, thyromegaly, or carotid bruits. 


 Lungs are clear to auscultation.


Cardiac exam reveals regular rate and  Rhythm. 


Abdominal exam reveals normal bowel sounds, nontender, no organomegaly.


Extremities are nonedematous and both femoral and pedal pulses are normal.


CNS: Alert and oriented 3.  No focal weakness.





- Constitutional


Vitals: 


                                        











Temp Pulse Resp BP Pulse Ox


 


 98.3 F   89   18   117/82   97 


 


 02/14/21 07:44  02/14/21 07:44  02/14/21 06:04  02/14/21 07:44  02/14/21 07:44











General appearance: Present: no acute distress





HEART Score





- HEART Score


EKG: Non-specific


Age: < 45


Risk factors: 1-2 risk factors


Troponin: 


                                        











Troponin T  0.076 ng/mL (0.00-0.029)  H  02/13/21  00:35    











Troponin: < normal limit





Results





- Labs


CBC & Chem 7: 


                                 02/13/21 07:01





                                 02/14/21 06:31


Labs: 


                             Laboratory Last Values











WBC  5.5 K/mm3 (4.5-11.0)   02/12/21  19:21    


 


RBC  4.23 M/mm3 (3.65-5.03)   02/12/21  19:21    


 


Hgb  8.4 gm/dl (10.1-14.3)  L  02/13/21  07:01    


 


Hct  27.1 % (30.3-42.9)  L  02/13/21  07:01    


 


MCV  70 fl (79-97)  L  02/12/21  19:21    


 


MCH  21 pg (28-32)  L  02/12/21  19:21    


 


MCHC  31 % (30-34)   02/12/21  19:21    


 


RDW  20.3 % (13.2-15.2)  H  02/12/21  19:21    


 


Plt Count  221 K/mm3 (140-440)   02/13/21  07:01    


 


Lymph % (Auto)  42.9 % (13.4-35.0)  H  02/12/21  19:21    


 


Mono % (Auto)  5.8 % (0.0-7.3)   02/12/21  19:21    


 


Eos % (Auto)  0.8 % (0.0-4.3)   02/12/21  19:21    


 


Baso % (Auto)  1.5 % (0.0-1.8)   02/12/21  19:21    


 


Lymph # (Auto)  2.4 K/mm3 (1.2-5.4)   02/12/21  19:21    


 


Mono # (Auto)  0.3 K/mm3 (0.0-0.8)   02/12/21  19:21    


 


Eos # (Auto)  0.0 K/mm3 (0.0-0.4)   02/12/21  19:21    


 


Baso # (Auto)  0.1 K/mm3 (0.0-0.1)   02/12/21  19:21    


 


Seg Neutrophils %  49.0 % (40.0-70.0)   02/12/21  19:21    


 


Seg Neutrophils #  2.7 K/mm3 (1.8-7.7)   02/12/21  19:21    


 


PT  17.1 Sec. (12.2-14.9)  H  02/13/21  08:46    


 


INR  1.41  (0.87-1.13)  H  02/13/21  08:46    


 


APTT  33.6 Sec. (24.2-36.6)   02/13/21  08:46    


 


Sodium  140 mmol/L (137-145)  D 02/14/21  06:31    


 


Potassium  3.7 mmol/L (3.6-5.0)   02/14/21  06:31    


 


Chloride  98.9 mmol/L ()   02/14/21  06:31    


 


Carbon Dioxide  29 mmol/L (22-30)   02/14/21  06:31    


 


Anion Gap  16 mmol/L  02/14/21  06:31    


 


BUN  21 mg/dL (7-17)  H  02/14/21  06:31    


 


Creatinine  1.5 mg/dL (0.6-1.2)  H  02/14/21  06:31    


 


Estimated GFR  46 ml/min  02/14/21  06:31    


 


BUN/Creatinine Ratio  14 %  02/14/21  06:31    


 


Glucose  108 mg/dL ()  H  02/14/21  06:31    


 


POC Glucose  139 mg/dL ()  H  02/13/21  16:19    


 


Calcium  8.6 mg/dL (8.4-10.2)   02/14/21  06:31    


 


Total Bilirubin  1.30 mg/dL (0.1-1.2)  H  02/12/21  19:21    


 


AST  25 units/L (5-40)   02/12/21  19:21    


 


ALT  31 units/L (7-56)   02/12/21  19:21    


 


Alkaline Phosphatase  74 units/L ()   02/12/21  19:21    


 


Total Creatine Kinase  Cancelled   02/12/21  19:21    


 


CK-MB (CK-2)  Cancelled   02/12/21  19:21    


 


CK-MB (CK-2) Rel Index  Cancelled   02/12/21  19:21    


 


Troponin T  0.076 ng/mL (0.00-0.029)  H  02/13/21  00:35    


 


NT-Pro-B Natriuret Pep  7279 pg/mL (0-450)  H  02/12/21  19:21    


 


Total Protein  6.6 g/dL (6.3-8.2)   02/12/21  19:21    


 


Albumin  3.2 g/dL (3.9-5)  L  02/12/21  19:21    


 


Albumin/Globulin Ratio  0.9 %  02/12/21  19:21    


 


Triglycerides  55 mg/dL (2-149)   02/12/21  19:21    


 


Cholesterol  76 mg/dL ()   02/12/21  19:21    


 


LDL Cholesterol Direct  49 mg/dL ()  L  02/12/21  19:21    


 


HDL Cholesterol  24 mg/dL (40-59)  L  02/12/21  19:21    


 


Cholesterol/HDL Ratio  3.16 %  02/12/21  19:21    


 


Urine Color  Straw  (Yellow)   02/12/21  Unknown


 


Urine Turbidity  Clear  (Clear)   02/12/21  Unknown


 


Urine pH  7.0  (5.0-7.0)   02/12/21  Unknown


 


Ur Specific Gravity  1.006  (1.003-1.030)   02/12/21  Unknown


 


Urine Protein  <15 mg/dl mg/dL (Negative)   02/12/21  Unknown


 


Urine Glucose (UA)  Neg mg/dL (Negative)   02/12/21  Unknown


 


Urine Ketones  Neg mg/dL (Negative)   02/12/21  Unknown


 


Urine Blood  Neg  (Negative)   02/12/21  Unknown


 


Urine Nitrite  Neg  (Negative)   02/12/21  Unknown


 


Urine Bilirubin  Neg  (Negative)   02/12/21  Unknown


 


Urine Urobilinogen  < 2.0 mg/dL (<2.0)   02/12/21  Unknown


 


Ur Leukocyte Esterase  Neg  (Negative)   02/12/21  Unknown


 


Urine WBC (Auto)  < 1.0 /HPF (0.0-6.0)   02/12/21  Unknown


 


Urine RBC (Auto)  1.0 /HPF (0.0-6.0)   02/12/21  Unknown


 


U Epithel Cells (Auto)  1.0 /HPF (0-13.0)   02/12/21  Unknown


 


Urine Bacteria (Auto)  1+ /HPF (Negative)   02/12/21  Unknown


 


Urine Mucus  Few /HPF  02/12/21  Unknown











Drake/IV: 


                                        





Voiding Method                   Toilet











Active Medications





- Current Medications


Current Medications: 














Generic Name Dose Route Start Last Admin





  Trade Name Freq  PRN Reason Stop Dose Admin


 


Albuterol  2.5 mg  02/12/21 23:00 





  Albuterol 2.5 Mg/3 Ml Nebu  IH  





  Q4HRT PRN  





  Shortness Of Breath  


 


Aspirin  81 mg  02/13/21 10:00  02/13/21 11:37





  Aspirin Ec 81 Mg Tab  PO   81 mg





  DAILY YUNIEL   Administration


 


Carvedilol  12.5 mg  02/13/21 10:00  02/13/21 22:49





  Carvedilol 12.5 Mg Tab  PO   12.5 mg





  BID YUNIEL   Administration


 


Furosemide  40 mg  02/13/21 06:00  02/14/21 05:19





  Furosemide 40 Mg/4 Ml Inj  IV   40 mg





  BID@0600,1800 YUNIEL   Administration


 


Heparin Sodium (Porcine)  5,000 unit  02/13/21 22:00  02/13/21 22:58





  Heparin 5,000 Unit/1 Ml Vial  SUB-Q   5,000 unit





  Q12HR YUNIEL   Administration


 


Milrinone Lactate/Dextrose  20 mg in 100 mls @ 10.001 mls/hr  02/13/21 13:00  

02/13/21 22:58





  Milrinone-D5w 20 Mg/100 Ml  IV  02/16/21 12:59  0.375 mcg/kg/min





  TITR YUNIEL   10.001 mls/hr





    Administration





  0.375 MCG/KG/MIN  


 


Morphine Sulfate  2 mg  02/13/21 09:58  02/14/21 07:41





  Morphine 2 Mg/1 Ml Inj  IV   2 mg





  Q4H PRN   Administration





  Pain, Moderate (4-6)  


 


Nitroglycerin  0.4 mg  02/12/21 22:57  02/13/21 05:50





  Nitroglycerin 0.4 Mg Tab Subl  SL   0.4 mg





  .Q5MIN PRN   Administration





  Chest Pain  


 


Spironolactone  25 mg  02/13/21 13:00  02/13/21 12:55





  Spironolactone 25 Mg Tab  PO   25 mg





  QDAY YUNIEL   Administration


 


Valsartan  160 mg  02/13/21 10:00  02/13/21 22:49





  Valsartan 160mg Tab  PO   160 mg





  BID YUNIEL   Administration

## 2021-02-14 NOTE — PROGRESS NOTE
Assessment and Plan





- Patient Problems


(1) Acute on chronic systolic heart failure


Current Visit: Yes   Status: Acute   


Plan to address problem: 


Continue optimal guideline directed medical therapy, including a trial of 

intravenous milrinone.








Subjective


Date of service: 02/14/21


Interval history: 





Patient is currently on intravenous milrinone, no further cardiac complaints.  

On telemetry monitor, she has a stable sinus rhythm.





Objective


                                   Vital Signs











  Temp Pulse Resp BP Pulse Ox


 


 02/14/21 11:48  97.7 F  88   143/80  96


 


 02/14/21 07:44  98.3 F  89   117/82  97


 


 02/14/21 06:04  98.6 F  87  18  106/65  94


 


 02/14/21 00:05   89   


 


 02/13/21 23:58  98.3 F  79  18  111/68  96


 


 02/13/21 22:49   94 H   119/68 


 


 02/13/21 20:00   91 H   


 


 02/13/21 19:20  98.1 F  94 H  18  119/68  97


 


 02/13/21 16:17  97.6 F  87   133/88  91


 


 02/13/21 14:00   83   


 


 02/13/21 13:52   88  25 H  128/89 














- Physical Examination


HEENT: Positive: PERRL


Neck: Positive: neck supple


Cardiac: Positive: Reg Rate and Rhythm


Lungs: Positive: Decreased Breath Sounds


Neuro: Positive: Grossly Intact


Abdomen: Positive: Soft


Skin: Positive: Clear


Extremities: Present: +1 Edema





- Labs and Meds


                          Comprehensive Metabolic Panel











  02/14/21 Range/Units





  06:31 


 


Sodium  140  D  (137-145)  mmol/L


 


Potassium  3.7  (3.6-5.0)  mmol/L


 


Chloride  98.9  ()  mmol/L


 


Carbon Dioxide  29  (22-30)  mmol/L


 


BUN  21 H  (7-17)  mg/dL


 


Creatinine  1.5 H  (0.6-1.2)  mg/dL


 


Glucose  108 H  ()  mg/dL


 


Calcium  8.6  (8.4-10.2)  mg/dL

## 2021-02-15 NOTE — PROGRESS NOTE
Assessment and Plan


Assessment and plan: 





Acute Exacerbation Systolic HF 


-EF  15-20% seen on Echo 11/2020


-BNP elevated at 7279


-Troponin negative 1, will trend


-CXR shows stable cardiomegaly without an additional acute abnormality


-Continue IV Lasix , ARB, BB, ASA


-Cardiology consulted








Acute atypical chest pain


-Continuous telemetry monitoring


-Continue supportive care


-Pain mgmt


-Troponin negative 


-EKG unrevealing for acute ischemic abnormalities


-Cardiology following





Acute hypoxic respiratory failure


-Etiology likely due to heart failure


-Monitor sats





HTN


-Monitor BP


-Resume home hypertensive meds 





Tobacco dependence


-Current smoker


-Counseled for cessation


-Nicotine patch when necessary





Anemia 


-Hemoglobin on admission 9.0


-No s/s of active bleeding


-Continue to monitor hemoglobin


-Transfuse as needed for hgb<7





2/13/2021


-Patient is on IV Lasix and guideline directed treatment for CHF


-Cardiology consulted and follow recommendation


-Hemoglobin is stable


-Patient is on heparin drip for non-STEMI





2/14/2021


-Cardiology evaluated the patient and put on optimal guideline directed medical 

therapy for CHF


-Patient is on milrinone drip


-Heparin changed to prophylaxis therapy by cardiology


-Patient is still complaining chest pain and will continue with as needed 

morphine.





2/15/2021.


Continue optimal guideline directed medical therapy for CHF.  


-Cardiology started a trial of intravenous milrinone.


-Continue aspirin, Coreg 12.5,g twice daily, Diovan 160 mg p.o. twice daily, 

Aldactone 25 mg p.o. daily and Lasix 40 mg IV twice daily.





History


Interval history: 





No new issues overnight.





Hospitalist Physical





- Constitutional


Vitals: 


                                        











Temp Pulse Resp BP Pulse Ox


 


 98.7 F   107 H  16   146/93   97 


 


 02/15/21 03:44  02/15/21 03:44  02/15/21 06:29  02/15/21 03:44  02/15/21 03:44











General appearance: Present: no acute distress





- EENT


Eyes: Present: PERRL, EOM intact


ENT: hearing intact, clear oral mucosa, dentition normal





- Neck


Neck: Present: supple, normal ROM





- Respiratory


Respiratory effort: normal


Respiratory: bilateral: CTA





- Cardiovascular


Rhythm: regular


Heart Sounds: Present: S1 & S2.  Absent: gallop, rub





- Extremities


Extremities: no ischemia, No edema, Full ROM





- Abdominal


General gastrointestinal: soft, non-tender, non-distended, normal bowel sounds





- Integumentary


Integumentary: Present: clear, warm, dry





- Neurologic


Neurologic: CNII-XII intact, moves all extremities





HEART Score





- HEART Score


EKG: Non-specific


Age: < 45


Risk factors: 1-2 risk factors


Troponin: 


                                        











Troponin T  0.076 ng/mL (0.00-0.029)  H  02/13/21  00:35    











Troponin: < normal limit





Results





- Labs


CBC & Chem 7: 


                                 02/13/21 07:01





                                 02/15/21 05:37


Labs: 


                             Laboratory Last Values











WBC  5.5 K/mm3 (4.5-11.0)   02/12/21  19:21    


 


RBC  4.23 M/mm3 (3.65-5.03)   02/12/21  19:21    


 


Hgb  8.4 gm/dl (10.1-14.3)  L  02/13/21  07:01    


 


Hct  27.1 % (30.3-42.9)  L  02/13/21  07:01    


 


MCV  70 fl (79-97)  L  02/12/21  19:21    


 


MCH  21 pg (28-32)  L  02/12/21  19:21    


 


MCHC  31 % (30-34)   02/12/21  19:21    


 


RDW  20.3 % (13.2-15.2)  H  02/12/21  19:21    


 


Plt Count  221 K/mm3 (140-440)   02/13/21  07:01    


 


Lymph % (Auto)  42.9 % (13.4-35.0)  H  02/12/21  19:21    


 


Mono % (Auto)  5.8 % (0.0-7.3)   02/12/21  19:21    


 


Eos % (Auto)  0.8 % (0.0-4.3)   02/12/21  19:21    


 


Baso % (Auto)  1.5 % (0.0-1.8)   02/12/21  19:21    


 


Lymph # (Auto)  2.4 K/mm3 (1.2-5.4)   02/12/21  19:21    


 


Mono # (Auto)  0.3 K/mm3 (0.0-0.8)   02/12/21  19:21    


 


Eos # (Auto)  0.0 K/mm3 (0.0-0.4)   02/12/21  19:21    


 


Baso # (Auto)  0.1 K/mm3 (0.0-0.1)   02/12/21  19:21    


 


Seg Neutrophils %  49.0 % (40.0-70.0)   02/12/21  19:21    


 


Seg Neutrophils #  2.7 K/mm3 (1.8-7.7)   02/12/21  19:21    


 


PT  17.1 Sec. (12.2-14.9)  H  02/13/21  08:46    


 


INR  1.41  (0.87-1.13)  H  02/13/21  08:46    


 


APTT  33.6 Sec. (24.2-36.6)   02/13/21  08:46    


 


Sodium  138 mmol/L (137-145)   02/15/21  05:37    


 


Potassium  3.7 mmol/L (3.6-5.0)   02/15/21  05:37    


 


Chloride  101.6 mmol/L ()   02/15/21  05:37    


 


Carbon Dioxide  30 mmol/L (22-30)   02/15/21  05:37    


 


Anion Gap  10 mmol/L  02/15/21  05:37    


 


BUN  17 mg/dL (7-17)   02/15/21  05:37    


 


Creatinine  1.3 mg/dL (0.6-1.2)  H  02/15/21  05:37    


 


Estimated GFR  55 ml/min  02/15/21  05:37    


 


BUN/Creatinine Ratio  13 %  02/15/21  05:37    


 


Glucose  106 mg/dL ()  H  02/15/21  05:37    


 


POC Glucose  139 mg/dL ()  H  02/13/21  16:19    


 


Calcium  8.3 mg/dL (8.4-10.2)  L  02/15/21  05:37    


 


Total Bilirubin  1.30 mg/dL (0.1-1.2)  H  02/12/21  19:21    


 


AST  25 units/L (5-40)   02/12/21  19:21    


 


ALT  31 units/L (7-56)   02/12/21  19:21    


 


Alkaline Phosphatase  74 units/L ()   02/12/21  19:21    


 


Total Creatine Kinase  Cancelled   02/12/21  19:21    


 


CK-MB (CK-2)  Cancelled   02/12/21  19:21    


 


CK-MB (CK-2) Rel Index  Cancelled   02/12/21  19:21    


 


Troponin T  0.076 ng/mL (0.00-0.029)  H  02/13/21  00:35    


 


NT-Pro-B Natriuret Pep  7279 pg/mL (0-450)  H  02/12/21  19:21    


 


Total Protein  6.6 g/dL (6.3-8.2)   02/12/21  19:21    


 


Albumin  3.2 g/dL (3.9-5)  L  02/12/21  19:21    


 


Albumin/Globulin Ratio  0.9 %  02/12/21  19:21    


 


Triglycerides  55 mg/dL (2-149)   02/12/21  19:21    


 


Cholesterol  76 mg/dL ()   02/12/21 19:21    


 


LDL Cholesterol Direct  49 mg/dL ()  L  02/12/21  19:21    


 


HDL Cholesterol  24 mg/dL (40-59)  L  02/12/21  19:21    


 


Cholesterol/HDL Ratio  3.16 %  02/12/21  19:21    


 


Urine Color  Straw  (Yellow)   02/12/21  Unknown


 


Urine Turbidity  Clear  (Clear)   02/12/21  Unknown


 


Urine pH  7.0  (5.0-7.0)   02/12/21  Unknown


 


Ur Specific Gravity  1.006  (1.003-1.030)   02/12/21  Unknown


 


Urine Protein  <15 mg/dl mg/dL (Negative)   02/12/21  Unknown


 


Urine Glucose (UA)  Neg mg/dL (Negative)   02/12/21  Unknown


 


Urine Ketones  Neg mg/dL (Negative)   02/12/21  Unknown


 


Urine Blood  Neg  (Negative)   02/12/21  Unknown


 


Urine Nitrite  Neg  (Negative)   02/12/21  Unknown


 


Urine Bilirubin  Neg  (Negative)   02/12/21  Unknown


 


Urine Urobilinogen  < 2.0 mg/dL (<2.0)   02/12/21  Unknown


 


Ur Leukocyte Esterase  Neg  (Negative)   02/12/21  Unknown


 


Urine WBC (Auto)  < 1.0 /HPF (0.0-6.0)   02/12/21  Unknown


 


Urine RBC (Auto)  1.0 /HPF (0.0-6.0)   02/12/21  Unknown


 


U Epithel Cells (Auto)  1.0 /HPF (0-13.0)   02/12/21  Unknown


 


Urine Bacteria (Auto)  1+ /HPF (Negative)   02/12/21  Unknown


 


Urine Mucus  Few /HPF  02/12/21  Unknown











Drake/IV: 


                                        





Voiding Method                   Toilet











Active Medications





- Current Medications


Current Medications: 














Generic Name Dose Route Start Last Admin





  Trade Name Freq  PRN Reason Stop Dose Admin


 


Albuterol  2.5 mg  02/12/21 23:00 





  Albuterol 2.5 Mg/3 Ml Nebu  IH  





  Q4HRT PRN  





  Shortness Of Breath  


 


Amoxicillin  500 mg  02/14/21 16:00  02/15/21 05:49





  Amoxicillin 500 Mg Cap  PO   500 mg





  Q8HR YUNIEL   Administration





  Protocol  


 


Aspirin  81 mg  02/13/21 10:00  02/14/21 09:55





  Aspirin Ec 81 Mg Tab  PO   81 mg





  DAILY YUNIEL   Administration


 


Carvedilol  12.5 mg  02/13/21 10:00  02/14/21 22:15





  Carvedilol 12.5 Mg Tab  PO   12.5 mg





  BID YUNIEL   Administration


 


Furosemide  40 mg  02/13/21 06:00  02/15/21 05:49





  Furosemide 40 Mg/4 Ml Inj  IV   40 mg





  BID@0600,1800 YUNIEL   Administration


 


Heparin Sodium (Porcine)  5,000 unit  02/13/21 22:00  02/14/21 22:15





  Heparin 5,000 Unit/1 Ml Vial  SUB-Q   5,000 unit





  Q12HR YUNIEL   Administration


 


Milrinone Lactate/Dextrose  20 mg in 100 mls @ 10.001 mls/hr  02/13/21 13:00  

02/15/21 07:35





  Milrinone-D5w 20 Mg/100 Ml  IV  02/16/21 12:59  0.375 mcg/kg/min





  TITR YUNIEL   10.001 mls/hr





    Administration





  0.375 MCG/KG/MIN  


 


Metronidazole  500 mg  02/14/21 16:00  02/15/21 05:49





  Metronidazole 500 Mg Tab  PO   500 mg





  Q8HR YUNIEL   Administration





  Protocol  


 


Morphine Sulfate  2 mg  02/13/21 09:58  02/15/21 05:59





  Morphine 2 Mg/1 Ml Inj  IV   2 mg





  Q4H PRN   Administration





  Pain, Moderate (4-6)  


 


Nitroglycerin  0.4 mg  02/12/21 22:57  02/13/21 05:50





  Nitroglycerin 0.4 Mg Tab Subl  SL   0.4 mg





  .Q5MIN PRN   Administration





  Chest Pain  


 


Spironolactone  25 mg  02/13/21 13:00  02/14/21 09:55





  Spironolactone 25 Mg Tab  PO   25 mg





  QDAY YUNIEL   Administration


 


Valsartan  160 mg  02/13/21 10:00  02/14/21 22:14





  Valsartan 160mg Tab  PO   160 mg





  BID YUNIEL   Administration

## 2021-02-15 NOTE — PROGRESS NOTE
Assessment and Plan





- Patient Problems


(1) Acute on chronic systolic heart failure


Current Visit: Yes   Status: Acute   


Plan to address problem: 


Continue guideline directed medical therapy including intravenous milrinone 

infusion.








Subjective


Date of service: 02/15/21


Interval history: 





Patient is comfortable, no cardiac complaints.  She has a stable sinus rhythm at

88, and has stable blood pressures.  Intravenous milrinone infusion is ongoing.





Objective


                                   Vital Signs











  Temp Pulse Pulse Resp Resp BP Pulse Ox


 


 02/15/21 06:29     16   


 


 02/15/21 05:59     16   


 


 02/15/21 03:44  98.7 F  107 H   18   146/93  97


 


 02/15/21 00:51  97.5 F L    18   


 


 02/15/21 00:44  97.5 F L  47 L   17   123/78  100


 


 02/14/21 22:30    67  16    100


 


 02/14/21 22:15   73     142/124 


 


 02/14/21 22:14   73     142/124 


 


 02/14/21 21:25  98.1 F  73   18   142/124  100


 


 02/14/21 21:00     18   


 


 02/14/21 20:38      16  


 


 02/14/21 20:30     18   


 


 02/14/21 20:03   108 H     


 


 02/14/21 15:57  97.6 F  87   18   125/83  95


 


 02/14/21 12:30   89     


 


 02/14/21 11:48  97.7 F  88     143/80  96














- Physical Examination


General: No Apparent Distress


HEENT: Positive: PERRL


Neck: Positive: neck supple


Cardiac: Positive: Reg Rate and Rhythm


Lungs: Positive: Decreased Breath Sounds


Neuro: Positive: Grossly Intact


Abdomen: Positive: Soft


Skin: Positive: Clear


Extremities: Present: +1 Edema





- Labs and Meds


                          Comprehensive Metabolic Panel











  02/15/21 Range/Units





  05:37 


 


Sodium  138  (137-145)  mmol/L


 


Potassium  3.7  (3.6-5.0)  mmol/L


 


Chloride  101.6  ()  mmol/L


 


Carbon Dioxide  30  (22-30)  mmol/L


 


BUN  17  (7-17)  mg/dL


 


Creatinine  1.3 H  (0.6-1.2)  mg/dL


 


Glucose  106 H  ()  mg/dL


 


Calcium  8.3 L  (8.4-10.2)  mg/dL

## 2021-02-16 NOTE — PROGRESS NOTE
Assessment and Plan


Assessment and plan: 





Acute Exacerbation Systolic HF 


-EF  15-20% seen on Echo 11/2020


-BNP elevated at 7279


-Troponin negative 1, will trend


-CXR shows stable cardiomegaly without an additional acute abnormality


-Continue IV Lasix , ARB, BB, ASA


-Cardiology consulted








Acute atypical chest pain


-Continuous telemetry monitoring


-Continue supportive care


-Pain mgmt


-Troponin negative 


-EKG unrevealing for acute ischemic abnormalities


-Cardiology following





Acute hypoxic respiratory failure


-Etiology likely due to heart failure


-Monitor sats





HTN


-Monitor BP


-Resume home hypertensive meds 





Tobacco dependence


-Current smoker


-Counseled for cessation


-Nicotine patch when necessary





Anemia 


-Hemoglobin on admission 9.0


-No s/s of active bleeding


-Continue to monitor hemoglobin


-Transfuse as needed for hgb<7





2/13/2021


-Patient is on IV Lasix and guideline directed treatment for CHF


-Cardiology consulted and follow recommendation


-Hemoglobin is stable


-Patient is on heparin drip for non-STEMI





2/14/2021


-Cardiology evaluated the patient and put on optimal guideline directed medical 

therapy for CHF


-Patient is on milrinone drip


-Heparin changed to prophylaxis therapy by cardiology


-Patient is still complaining chest pain and will continue with as needed 

morphine.





2/15/2021.


Continue optimal guideline directed medical therapy for CHF.  


-Cardiology started a trial of intravenous milrinone.


-Continue aspirin, Coreg 12.5,g twice daily, Diovan 160 mg p.o. twice daily, 

Aldactone 25 mg p.o. daily and Lasix 40 mg IV twice daily.





2/16/2021.


-Continue IV milrinone per cardiology recommendations.


-Continue aspirin, Coreg 12.5,g twice daily, Diovan 160 mg p.o. twice daily, 

Aldactone 25 mg p.o. daily and Lasix 40 mg IV twice daily.





History


Interval history: 





No new issues overnight.





Hospitalist Physical





- Constitutional


Vitals: 


                                        











Temp Pulse Resp BP Pulse Ox


 


 98.6 F   57 L  20   119/85   100 


 


 02/16/21 08:30  02/16/21 08:30  02/16/21 08:30  02/16/21 08:30  02/16/21 08:30











General appearance: Present: no acute distress





- EENT


Eyes: Present: PERRL, EOM intact


ENT: hearing intact, clear oral mucosa, dentition normal





- Neck


Neck: Present: supple, normal ROM





- Respiratory


Respiratory effort: normal


Respiratory: bilateral: CTA





- Cardiovascular


Rhythm: regular


Heart Sounds: Present: S1 & S2.  Absent: gallop, rub





- Extremities


Extremities: no ischemia, No edema, Full ROM





- Abdominal


General gastrointestinal: soft, non-tender, non-distended, normal bowel sounds





- Integumentary


Integumentary: Present: clear, warm, dry





- Neurologic


Neurologic: CNII-XII intact, moves all extremities





HEART Score





- HEART Score


EKG: Non-specific


Age: < 45


Risk factors: 1-2 risk factors


Troponin: 


                                        











Troponin T  0.076 ng/mL (0.00-0.029)  H  02/13/21  00:35    











Troponin: < normal limit





Results





- Labs


CBC & Chem 7: 


                                 02/16/21 05:34





                                 02/16/21 05:34


Labs: 


                             Laboratory Last Values











WBC  8.2 K/mm3 (4.5-11.0)   02/16/21  05:34    


 


RBC  4.62 M/mm3 (3.65-5.03)   02/16/21  05:34    


 


Hgb  9.7 gm/dl (10.1-14.3)  L  02/16/21  05:34    


 


Hct  31.6 % (30.3-42.9)   02/16/21  05:34    


 


MCV  68 fl (79-97)  L  02/16/21  05:34    


 


MCH  21 pg (28-32)  L  02/16/21  05:34    


 


MCHC  31 % (30-34)   02/16/21  05:34    


 


RDW  20.3 % (13.2-15.2)  H  02/16/21  05:34    


 


Plt Count  213 K/mm3 (140-440)   02/16/21  05:34    


 


Lymph % (Auto)  28.3 % (13.4-35.0)   02/16/21  05:34    


 


Mono % (Auto)  13.3 % (0.0-7.3)  H  02/16/21  05:34    


 


Eos % (Auto)  4.4 % (0.0-4.3)  H  02/16/21  05:34    


 


Baso % (Auto)  0.9 % (0.0-1.8)   02/16/21  05:34    


 


Lymph # (Auto)  2.3 K/mm3 (1.2-5.4)   02/16/21  05:34    


 


Mono # (Auto)  1.1 K/mm3 (0.0-0.8)  H  02/16/21  05:34    


 


Eos # (Auto)  0.4 K/mm3 (0.0-0.4)   02/16/21  05:34    


 


Baso # (Auto)  0.1 K/mm3 (0.0-0.1)   02/16/21  05:34    


 


Seg Neutrophils %  53.1 % (40.0-70.0)   02/16/21  05:34    


 


Seg Neutrophils #  4.4 K/mm3 (1.8-7.7)   02/16/21  05:34    


 


PT  17.1 Sec. (12.2-14.9)  H  02/13/21  08:46    


 


INR  1.41  (0.87-1.13)  H  02/13/21  08:46    


 


APTT  33.6 Sec. (24.2-36.6)   02/13/21  08:46    


 


Sodium  139 mmol/L (137-145)   02/16/21  05:34    


 


Potassium  4.0 mmol/L (3.6-5.0)   02/16/21  05:34    


 


Chloride  102.7 mmol/L ()   02/16/21  05:34    


 


Carbon Dioxide  28 mmol/L (22-30)   02/16/21  05:34    


 


Anion Gap  12 mmol/L  02/16/21  05:34    


 


BUN  17 mg/dL (7-17)   02/16/21  05:34    


 


Creatinine  1.1 mg/dL (0.6-1.2)   02/16/21  05:34    


 


Estimated GFR  > 60 ml/min  02/16/21  05:34    


 


BUN/Creatinine Ratio  15 %  02/16/21  05:34    


 


Glucose  94 mg/dL ()   02/16/21  05:34    


 


POC Glucose  139 mg/dL ()  H  02/13/21  16:19    


 


Calcium  8.4 mg/dL (8.4-10.2)   02/16/21  05:34    


 


Total Bilirubin  1.30 mg/dL (0.1-1.2)  H  02/12/21  19:21    


 


AST  25 units/L (5-40)   02/12/21  19:21    


 


ALT  31 units/L (7-56)   02/12/21  19:21    


 


Alkaline Phosphatase  74 units/L ()   02/12/21  19:21    


 


Total Creatine Kinase  Cancelled   02/12/21  19:21    


 


CK-MB (CK-2)  Cancelled   02/12/21  19:21    


 


CK-MB (CK-2) Rel Index  Cancelled   02/12/21  19:21    


 


Troponin T  0.076 ng/mL (0.00-0.029)  H  02/13/21  00:35    


 


NT-Pro-B Natriuret Pep  7279 pg/mL (0-450)  H  02/12/21  19:21    


 


Total Protein  6.6 g/dL (6.3-8.2)   02/12/21  19:21    


 


Albumin  3.2 g/dL (3.9-5)  L  02/12/21  19:21    


 


Albumin/Globulin Ratio  0.9 %  02/12/21  19:21    


 


Triglycerides  55 mg/dL (2-149)   02/12/21  19:21    


 


Cholesterol  76 mg/dL ()   02/12/21  19:21    


 


LDL Cholesterol Direct  49 mg/dL ()  L  02/12/21  19:21    


 


HDL Cholesterol  24 mg/dL (40-59)  L  02/12/21  19:21    


 


Cholesterol/HDL Ratio  3.16 %  02/12/21  19:21    


 


Urine Color  Straw  (Yellow)   02/12/21  Unknown


 


Urine Turbidity  Clear  (Clear)   02/12/21  Unknown


 


Urine pH  7.0  (5.0-7.0)   02/12/21  Unknown


 


Ur Specific Gravity  1.006  (1.003-1.030)   02/12/21  Unknown


 


Urine Protein  <15 mg/dl mg/dL (Negative)   02/12/21  Unknown


 


Urine Glucose (UA)  Neg mg/dL (Negative)   02/12/21  Unknown


 


Urine Ketones  Neg mg/dL (Negative)   02/12/21  Unknown


 


Urine Blood  Neg  (Negative)   02/12/21  Unknown


 


Urine Nitrite  Neg  (Negative)   02/12/21  Unknown


 


Urine Bilirubin  Neg  (Negative)   02/12/21  Unknown


 


Urine Urobilinogen  < 2.0 mg/dL (<2.0)   02/12/21  Unknown


 


Ur Leukocyte Esterase  Neg  (Negative)   02/12/21  Unknown


 


Urine WBC (Auto)  < 1.0 /HPF (0.0-6.0)   02/12/21  Unknown


 


Urine RBC (Auto)  1.0 /HPF (0.0-6.0)   02/12/21  Unknown


 


U Epithel Cells (Auto)  1.0 /HPF (0-13.0)   02/12/21  Unknown


 


Urine Bacteria (Auto)  1+ /HPF (Negative)   02/12/21  Unknown


 


Urine Mucus  Few /HPF  02/12/21  Unknown











Drake/IV: 


                                        





Voiding Method                   Toilet











Active Medications





- Current Medications


Current Medications: 














Generic Name Dose Route Start Last Admin





  Trade Name Freq  PRN Reason Stop Dose Admin


 


Albuterol  2.5 mg  02/12/21 23:00 





  Albuterol 2.5 Mg/3 Ml Nebu  IH  





  Q4HRT PRN  





  Shortness Of Breath  


 


Amoxicillin  500 mg  02/14/21 16:00  02/16/21 05:46





  Amoxicillin 500 Mg Cap  PO  02/21/21 06:01  500 mg





  Q8HR YUNIEL   Administration





  Protocol  


 


Aspirin  81 mg  02/13/21 10:00  02/15/21 09:46





  Aspirin Ec 81 Mg Tab  PO   81 mg





  DAILY YUNIEL   Administration


 


Carvedilol  12.5 mg  02/13/21 10:00  02/15/21 21:45





  Carvedilol 12.5 Mg Tab  PO   12.5 mg





  BID YUNIEL   Administration


 


Furosemide  40 mg  02/13/21 06:00  02/16/21 05:47





  Furosemide 40 Mg/4 Ml Inj  IV   40 mg





  BID@0600,1800 YUNIEL   Administration


 


Heparin Sodium (Porcine)  5,000 unit  02/13/21 22:00  02/15/21 21:44





  Heparin 5,000 Unit/1 Ml Vial  SUB-Q   5,000 unit





  Q12HR YUNIEL   Administration


 


Milrinone Lactate/Dextrose  20 mg in 100 mls @ 10.001 mls/hr  02/13/21 13:00  

02/16/21 05:54





  Milrinone-D5w 20 Mg/100 Ml  IV  02/16/21 12:59  0.375 mcg/kg/min





  TITR YUNIEL   10.001 mls/hr





    Administration





  0.375 MCG/KG/MIN  


 


Metronidazole  500 mg  02/14/21 16:00  02/16/21 05:47





  Metronidazole 500 Mg Tab  PO  02/21/21 06:01  500 mg





  Q8HR YUNIEL   Administration





  Protocol  


 


Morphine Sulfate  2 mg  02/13/21 09:58  02/16/21 05:46





  Morphine 2 Mg/1 Ml Inj  IV   2 mg





  Q4H PRN   Administration





  Pain, Moderate (4-6)  


 


Nitroglycerin  0.4 mg  02/12/21 22:57  02/13/21 05:50





  Nitroglycerin 0.4 Mg Tab Subl  SL   0.4 mg





  .Q5MIN PRN   Administration





  Chest Pain  


 


Spironolactone  25 mg  02/13/21 13:00  02/15/21 09:46





  Spironolactone 25 Mg Tab  PO   25 mg





  QDAY YUNIEL   Administration


 


Valsartan  160 mg  02/13/21 10:00  02/15/21 21:44





  Valsartan 160mg Tab  PO   160 mg





  BID YUNIEL   Administration

## 2021-02-16 NOTE — PROGRESS NOTE
Assessment and Plan





Acute on chronic systolic heart failure


Hypertension


Hx of Nonischemic CMP


   LVEF 15-20% by echo 11/2020








Recommendations:


Dietary restrictions.


Daily weight and strict I's and O's.


Aggressive medical therapy for decompensated heart failure including a trial of 

IV milrinone for an additional 48 hours.


As outpatient, she will undergo further evaluation for advanced primary device 

therapy.





Subjective


Date of service: 02/16/21


Interval history: 





Patient reports nausea but no vomiting. 


IV milrinone continues. Patient reports she is diuresing well.





Objective


                                   Vital Signs











  Temp Pulse Pulse Resp BP BP Pulse Ox


 


 02/16/21 08:30  98.6 F  57 L   20  119/85   100


 


 02/16/21 04:51  97.9 F  70   18  132/78   97


 


 02/16/21 02:00   101 H     


 


 02/16/21 00:05  98.7 F  118 H   24  139/88   90


 


 02/15/21 22:00    68  18    99


 


 02/15/21 21:45   68    168/128  


 


 02/15/21 21:44   68    168/128  


 


 02/15/21 21:15  98.6 F  68   22  168/128   100


 


 02/15/21 16:53  98.9 F  101 H   20   142/88  96


 


 02/15/21 14:00   97 H     


 


 02/15/21 12:46     17   


 


 02/15/21 12:16     18   


 


 02/15/21 12:08  98.2 F  56 L   18   130/76  96














- Physical Examination


General: No Apparent Distress


HEENT: Positive: PERRL


Neck: Positive: neck supple


Cardiac: Positive: Reg Rate and Rhythm


Lungs: Positive: Decreased Breath Sounds


Neuro: Positive: Grossly Intact


Abdomen: Positive: Soft


Extremities: Absent: edema





- Labs and Meds


                                       CBC











  02/16/21 Range/Units





  05:34 


 


WBC  8.2  (4.5-11.0)  K/mm3


 


RBC  4.62  (3.65-5.03)  M/mm3


 


Hgb  9.7 L  (10.1-14.3)  gm/dl


 


Hct  31.6  (30.3-42.9)  %


 


Plt Count  213  (140-440)  K/mm3


 


Lymph # (Auto)  2.3  (1.2-5.4)  K/mm3


 


Mono # (Auto)  1.1 H  (0.0-0.8)  K/mm3


 


Eos # (Auto)  0.4  (0.0-0.4)  K/mm3


 


Baso # (Auto)  0.1  (0.0-0.1)  K/mm3








                          Comprehensive Metabolic Panel











  02/16/21 Range/Units





  05:34 


 


Sodium  139  (137-145)  mmol/L


 


Potassium  4.0  (3.6-5.0)  mmol/L


 


Chloride  102.7  ()  mmol/L


 


Carbon Dioxide  28  (22-30)  mmol/L


 


BUN  17  (7-17)  mg/dL


 


Creatinine  1.1  (0.6-1.2)  mg/dL


 


Glucose  94  ()  mg/dL


 


Calcium  8.4  (8.4-10.2)  mg/dL

## 2021-02-17 NOTE — PROGRESS NOTE
Assessment and Plan


Assessment and plan: 





Acute on chronic exacerbation Systolic HF 


-EF  15-20% seen on Echo 11/2020


-BNP elevated at 7279


-Troponin negative 1, will trend


-CXR shows stable cardiomegaly without an additional acute abnormality


-Continue IV Lasix , ARB, BB, ASA


-Cardiology consulted





Nonischemic cardiomyopathy.





Acute atypical chest pain


-Continuous telemetry monitoring


-Continue supportive care


-Pain mgmt


-Troponin negative 


-EKG unrevealing for acute ischemic abnormalities


-Cardiology following





Acute hypoxic respiratory failure


-Etiology likely due to heart failure


-Monitor sats





HTN


-Monitor BP


-Resume home hypertensive meds 





Tobacco dependence


-Current smoker


-Counseled for cessation


-Nicotine patch when necessary





Anemia 


-Hemoglobin on admission 9.0


-No s/s of active bleeding


-Continue to monitor hemoglobin


-Transfuse as needed for hgb<7





2/13/2021


-Patient is on IV Lasix and guideline directed treatment for CHF


-Cardiology consulted and follow recommendation


-Hemoglobin is stable


-Patient is on heparin drip for non-STEMI





2/14/2021


-Cardiology evaluated the patient and put on optimal guideline directed medical 

therapy for CHF


-Patient is on milrinone drip


-Heparin changed to prophylaxis therapy by cardiology


-Patient is still complaining chest pain and will continue with as needed 

morphine.





2/15/2021.


Continue optimal guideline directed medical therapy for CHF.  


-Cardiology started a trial of intravenous milrinone.


-Continue aspirin, Coreg 12.5,g twice daily, Diovan 160 mg p.o. twice daily, 

Aldactone 25 mg p.o. daily and Lasix 40 mg IV twice daily.





2/16/2021.


-Continue IV milrinone per cardiology recommendations.


-Continue aspirin, Coreg 12.5,g twice daily, Diovan 160 mg p.o. twice daily, 

Aldactone 25 mg p.o. daily and Lasix 40 mg IV twice daily.





2/17/2021.  Continue to follow dietary restrictions, daily weight and strict I's

and O's.  Continue aggressive edical therapy for decompensated heart failure 

including a trial of IV milrinone for an additional 24 hours per cardiology.  As

outpatient, she will undergo further evaluation for advanced primary device 

therapy.  Anticipate discharge in a.m.








History


Interval history: 





No new issues overnight.  Patient complaining of toothache.





Hospitalist Physical





- Constitutional


Vitals: 


                                        











Temp Pulse Resp BP Pulse Ox


 


 98.4 F   53 L  18   148/88   96 


 


 02/17/21 07:14  02/17/21 07:14  02/17/21 07:14  02/17/21 07:14  02/17/21 07:14











General appearance: Present: no acute distress





- EENT


Eyes: Present: PERRL, EOM intact


ENT: hearing intact, clear oral mucosa, dentition normal





- Neck


Neck: Present: supple, normal ROM





- Respiratory


Respiratory effort: normal


Respiratory: bilateral: CTA





- Cardiovascular


Rhythm: regular


Heart Sounds: Present: S1 & S2.  Absent: gallop, rub





- Extremities


Extremities: no ischemia, No edema, Full ROM





- Abdominal


General gastrointestinal: soft, non-tender, non-distended, normal bowel sounds





- Integumentary


Integumentary: Present: clear, warm, dry





- Neurologic


Neurologic: CNII-XII intact, moves all extremities





HEART Score





- HEART Score


EKG: Non-specific


Age: < 45


Risk factors: 1-2 risk factors


Troponin: 


                                        











Troponin T  0.076 ng/mL (0.00-0.029)  H  02/13/21  00:35    











Troponin: < normal limit





Results





- Labs


CBC & Chem 7: 


                                 02/16/21 05:34





                                 02/16/21 05:34


Labs: 


                             Laboratory Last Values











WBC  8.2 K/mm3 (4.5-11.0)   02/16/21  05:34    


 


RBC  4.62 M/mm3 (3.65-5.03)   02/16/21  05:34    


 


Hgb  9.7 gm/dl (10.1-14.3)  L  02/16/21  05:34    


 


Hct  31.6 % (30.3-42.9)   02/16/21  05:34    


 


MCV  68 fl (79-97)  L  02/16/21  05:34    


 


MCH  21 pg (28-32)  L  02/16/21  05:34    


 


MCHC  31 % (30-34)   02/16/21  05:34    


 


RDW  20.3 % (13.2-15.2)  H  02/16/21  05:34    


 


Plt Count  213 K/mm3 (140-440)   02/16/21  05:34    


 


Lymph % (Auto)  28.3 % (13.4-35.0)   02/16/21  05:34    


 


Mono % (Auto)  13.3 % (0.0-7.3)  H  02/16/21  05:34    


 


Eos % (Auto)  4.4 % (0.0-4.3)  H  02/16/21  05:34    


 


Baso % (Auto)  0.9 % (0.0-1.8)   02/16/21  05:34    


 


Lymph # (Auto)  2.3 K/mm3 (1.2-5.4)   02/16/21  05:34    


 


Mono # (Auto)  1.1 K/mm3 (0.0-0.8)  H  02/16/21  05:34    


 


Eos # (Auto)  0.4 K/mm3 (0.0-0.4)   02/16/21  05:34    


 


Baso # (Auto)  0.1 K/mm3 (0.0-0.1)   02/16/21  05:34    


 


Seg Neutrophils %  53.1 % (40.0-70.0)   02/16/21  05:34    


 


Seg Neutrophils #  4.4 K/mm3 (1.8-7.7)   02/16/21  05:34    


 


PT  17.1 Sec. (12.2-14.9)  H  02/13/21  08:46    


 


INR  1.41  (0.87-1.13)  H  02/13/21  08:46    


 


APTT  33.6 Sec. (24.2-36.6)   02/13/21  08:46    


 


Sodium  139 mmol/L (137-145)   02/16/21  05:34    


 


Potassium  4.0 mmol/L (3.6-5.0)   02/16/21  05:34    


 


Chloride  102.7 mmol/L ()   02/16/21  05:34    


 


Carbon Dioxide  28 mmol/L (22-30)   02/16/21  05:34    


 


Anion Gap  12 mmol/L  02/16/21  05:34    


 


BUN  17 mg/dL (7-17)   02/16/21  05:34    


 


Creatinine  1.1 mg/dL (0.6-1.2)   02/16/21  05:34    


 


Estimated GFR  > 60 ml/min  02/16/21  05:34    


 


BUN/Creatinine Ratio  15 %  02/16/21  05:34    


 


Glucose  94 mg/dL ()   02/16/21  05:34    


 


POC Glucose  139 mg/dL ()  H  02/13/21  16:19    


 


Calcium  8.4 mg/dL (8.4-10.2)   02/16/21  05:34    


 


Total Bilirubin  1.30 mg/dL (0.1-1.2)  H  02/12/21  19:21    


 


AST  25 units/L (5-40)   02/12/21  19:21    


 


ALT  31 units/L (7-56)   02/12/21  19:21    


 


Alkaline Phosphatase  74 units/L ()   02/12/21  19:21    


 


Total Creatine Kinase  Cancelled   02/12/21  19:21    


 


CK-MB (CK-2)  Cancelled   02/12/21  19:21    


 


CK-MB (CK-2) Rel Index  Cancelled   02/12/21  19:21    


 


Troponin T  0.076 ng/mL (0.00-0.029)  H  02/13/21  00:35    


 


NT-Pro-B Natriuret Pep  7279 pg/mL (0-450)  H  02/12/21  19:21    


 


Total Protein  6.6 g/dL (6.3-8.2)   02/12/21  19:21    


 


Albumin  3.2 g/dL (3.9-5)  L  02/12/21  19:21    


 


Albumin/Globulin Ratio  0.9 %  02/12/21  19:21    


 


Triglycerides  55 mg/dL (2-149)   02/12/21  19:21    


 


Cholesterol  76 mg/dL ()   02/12/21  19:21    


 


LDL Cholesterol Direct  49 mg/dL ()  L  02/12/21  19:21    


 


HDL Cholesterol  24 mg/dL (40-59)  L  02/12/21  19:21    


 


Cholesterol/HDL Ratio  3.16 %  02/12/21  19:21    


 


Urine Color  Straw  (Yellow)   02/12/21  Unknown


 


Urine Turbidity  Clear  (Clear)   02/12/21  Unknown


 


Urine pH  7.0  (5.0-7.0)   02/12/21  Unknown


 


Ur Specific Gravity  1.006  (1.003-1.030)   02/12/21  Unknown


 


Urine Protein  <15 mg/dl mg/dL (Negative)   02/12/21  Unknown


 


Urine Glucose (UA)  Neg mg/dL (Negative)   02/12/21  Unknown


 


Urine Ketones  Neg mg/dL (Negative)   02/12/21  Unknown


 


Urine Blood  Neg  (Negative)   02/12/21  Unknown


 


Urine Nitrite  Neg  (Negative)   02/12/21  Unknown


 


Urine Bilirubin  Neg  (Negative)   02/12/21  Unknown


 


Urine Urobilinogen  < 2.0 mg/dL (<2.0)   02/12/21  Unknown


 


Ur Leukocyte Esterase  Neg  (Negative)   02/12/21  Unknown


 


Urine WBC (Auto)  < 1.0 /HPF (0.0-6.0)   02/12/21  Unknown


 


Urine RBC (Auto)  1.0 /HPF (0.0-6.0)   02/12/21  Unknown


 


U Epithel Cells (Auto)  1.0 /HPF (0-13.0)   02/12/21  Unknown


 


Urine Bacteria (Auto)  1+ /HPF (Negative)   02/12/21  Unknown


 


Urine Mucus  Few /HPF  02/12/21  Unknown











Drake/IV: 


                                        





Voiding Method                   Toilet











Active Medications





- Current Medications


Current Medications: 














Generic Name Dose Route Start Last Admin





  Trade Name Freq  PRN Reason Stop Dose Admin


 


Acetaminophen  650 mg  02/16/21 18:03  02/16/21 21:36





  Acetaminophen 325 Mg Tab  PO   650 mg





  Q6H PRN   Administration





  Pain, Moderate (4-6)  


 


Albuterol  2.5 mg  02/12/21 23:00 





  Albuterol 2.5 Mg/3 Ml Nebu  IH  





  Q4HRT PRN  





  Shortness Of Breath  


 


Amoxicillin  500 mg  02/14/21 16:00  02/17/21 05:43





  Amoxicillin 500 Mg Cap  PO  02/21/21 06:01  500 mg





  Q8HR YUNIEL   Administration





  Protocol  


 


Aspirin  81 mg  02/13/21 10:00  02/16/21 10:29





  Aspirin Ec 81 Mg Tab  PO   81 mg





  DAILY YUNIEL   Administration


 


Carvedilol  12.5 mg  02/13/21 10:00  02/16/21 21:34





  Carvedilol 12.5 Mg Tab  PO   12.5 mg





  BID YUNIEL   Administration


 


Furosemide  40 mg  02/13/21 06:00  02/17/21 05:43





  Furosemide 40 Mg/4 Ml Inj  IV   40 mg





  BID@0600,1800 YUNIEL   Administration


 


Heparin Sodium (Porcine)  5,000 unit  02/13/21 22:00  02/16/21 21:34





  Heparin 5,000 Unit/1 Ml Vial  SUB-Q   5,000 unit





  Q12HR YUNIEL   Administration


 


Milrinone Lactate/Dextrose  20 mg in 100 mls @ 10.001 mls/hr  02/16/21 13:00  

02/17/21 03:31





  Milrinone-D5w 20 Mg/100 Ml  IV  02/18/21 12:59  0.375 mcg/kg/min





  TITR YUNIEL   10.001 mls/hr





    Administration





  0.375 MCG/KG/MIN  


 


Metronidazole  500 mg  02/14/21 16:00  02/17/21 05:43





  Metronidazole 500 Mg Tab  PO  02/21/21 06:01  500 mg





  Q8HR YUNIEL   Administration





  Protocol  


 


Morphine Sulfate  2 mg  02/13/21 09:58  02/16/21 05:46





  Morphine 2 Mg/1 Ml Inj  IV   2 mg





  Q4H PRN   Administration





  Pain, Moderate (4-6)  


 


Nitroglycerin  0.4 mg  02/12/21 22:57  02/13/21 05:50





  Nitroglycerin 0.4 Mg Tab Subl  SL   0.4 mg





  .Q5MIN PRN   Administration





  Chest Pain  


 


Ondansetron HCl  4 mg  02/16/21 11:30  02/16/21 15:03





  Ondansetron 4 Mg/2 Ml Inj  IV   4 mg





  Q6H PRN   Administration





  Nausea And Vomiting  


 


Spironolactone  25 mg  02/13/21 13:00  02/16/21 10:29





  Spironolactone 25 Mg Tab  PO   25 mg





  QDAY YUNIEL   Administration


 


Valsartan  160 mg  02/13/21 10:00  02/16/21 21:35





  Valsartan 160mg Tab  PO   160 mg





  BID YUNIEL   Administration

## 2021-02-17 NOTE — PROGRESS NOTE
Assessment and Plan





Acute on chronic systolic heart failure


Hypertension


Hx of Nonischemic CMP


   LVEF 15-20% by echo 11/2020





Recommendations:


Dietary restrictions.


Daily weight and strict I's and O's.


Continue aggressive medical therapy for decompensated heart failure including a 

trial of IV milrinone.


As outpatient, she will undergo further evaluation for advanced primary device 

therapy.





Subjective


Date of service: 02/17/21


Interval history: 





Patient is resting in bed and appears comfortable. 


IV milrinone continues. 


Short burst of NSVT seen on telemetry overnight. It is reported patient remained

asymptomatic.





Objective


                                   Vital Signs











  Temp Pulse Resp BP Pulse Ox


 


 02/17/21 07:14  98.4 F  53 L  18  148/88  96


 


 02/17/21 05:56  97.9 F  44 L  16  86/57  94


 


 02/17/21 00:37  98.0 F  51 L  18  85/66  100


 


 02/16/21 21:35   52 L   127/71 


 


 02/16/21 21:34   52 L   127/71 


 


 02/16/21 20:29  97.0 F L  52 L  14  127/71  100


 


 02/16/21 16:28  97.9 F  48 L  18  114/57  96


 


 02/16/21 14:00   124 H   


 


 02/16/21 11:37  99.2 F  46 L  20  132/99  93














- Physical Examination


General: No Apparent Distress


HEENT: Positive: PERRL


Neck: Positive: neck supple


Cardiac: Positive: Reg Rate and Rhythm


Lungs: Positive: Decreased Breath Sounds


Neuro: Positive: Grossly Intact


Abdomen: Positive: Soft


Extremities: Absent: edema

## 2021-02-18 NOTE — PROGRESS NOTE
Assessment and Plan





Acute on chronic systolic heart failure


Hypertension


Hx of Nonischemic CMP


   LVEF 15-20% by echo 11/2020





Recommendations:


Advised dietary restrictions.


Will stop IV milrinone. Continue medical therapy for acute on chronic heart 

failure.


Stable cardiac wise for discharge. As outpatient, she will undergo further 

evaluation for advanced primary device therapy.





Subjective


Date of service: 02/18/21


Interval history: 





Patient is resting in bed and appears comfortable. She reports her breathing is 

better. 





Objective


                                   Vital Signs











  Temp Pulse Resp BP Pulse Ox


 


 02/18/21 08:30  98.4 F  52 L  18  123/75  97


 


 02/18/21 03:17  98.3 F  53 L  17  128/83  95


 


 02/17/21 23:29  97.5 F L  51 L  16  119/92  97


 


 02/17/21 22:00   100 H   


 


 02/17/21 15:39  97.9 F  46 L  18  127/86  96


 


 02/17/21 11:24   48 L   146/96 


 


 02/17/21 11:23   48 L   146/96 


 


 02/17/21 11:09  98.1 F  48 L  18  146/92  97














- Physical Examination


General: No Apparent Distress


HEENT: Positive: PERRL


Neck: Positive: neck supple


Cardiac: Positive: Reg Rate and Rhythm


Lungs: Positive: Decreased Breath Sounds


Neuro: Positive: Grossly Intact


Abdomen: Positive: Soft


Extremities: Absent: edema

## 2021-02-18 NOTE — DISCHARGE SUMMARY
Providers





- Providers


Date of Admission: 


02/14/21 10:10





Date of discharge: 02/18/21


Attending physician: 


JASWINDER KUHN





                                        





02/12/21 22:57


Consult to Physician [CONS] Routine 


   Comment: 


   Consulting Provider: ROSENDA VIEYRA


   Physician Instructions: 


   Reason For Exam: CHF-AE, est pt














Hospitalization


Reason for admission: Acute on chronic systolic heart failure


Condition: Stable


Hospital course: 





Patient is a 40-year-old woman with a history of dilated nonischemic 

cardiomyopathy and chronic systolic heart failure dating back to at least 2017. 

 In 4/2017, cardiac catheterization demonstrated normal coronary arteries, with 

left ventricular ejection fraction 20%.  LV function has remained severely 

impaired, most recent echocardiogram 2 months ago reported an ejection fraction 

persistently depressed at 15 to 20%.  The patient has an admitted history of 

poor compliance with medical therapy and with outpatient cardiology office 

visits.  As a result, there has been recurrent admissions for heart failure 

exacerbation, and no opportunities to advance further therapies including 

primary device therapies.





She is admitted to the hospital at this time with increasing shortness of 

breath, fatigue and clinical evidence of decompensated heart failure.  On 

presentation, her systolic blood pressure was 180.  EKG is normal sinus rhythm, 

left ventricular hypertrophy with repolarization abnormalities of LVH.  Chest x-

ray reveals massive cardiomegaly, but with minimal to no significant 

interstitial edema.  She reports that she had a negative COVID-19 test in the 

few days prior to her hospital presentation.





The patient was admitted with the below diagnosis of acute on chronic systolic 

heart failure exacerbation, nonischemic cardiomyopathy, atypical chest pain, 

acute hypoxic respiratory failure, hypertension, tobacco dependence and anemia.





Acute on chronic exacerbation Systolic HF 


-EF  15-20% seen on Echo 11/2020


-BNP elevated at 7279


-Troponin negative 


-CXR shows stable cardiomegaly without an additional acute abnormality


-Continue IV Lasix , ARB, BB, ASA


-Cardiology consulted





Nonischemic cardiomyopathy.





Acute atypical chest pain


-Continuous telemetry monitoring


-Continue supportive care


-Pain mgmt


-Troponin negative 


-EKG unrevealing for acute ischemic abnormalities


-Cardiology following





Acute hypoxic respiratory failure


-Etiology likely due to heart failure


-Monitor sats





HTN


-Monitor BP


-Resume home hypertensive meds 





Tobacco dependence


-Current smoker


-Counseled for cessation


-Nicotine patch when necessary





Anemia 


-Hemoglobin on admission 9.0


-No s/s of active bleeding


-Continue to monitor hemoglobin


-Transfuse as needed for hgb<7





Tooth abscess.  Continue p.o. antibiotics





Hospital course: 





2/13/2021


-Patient is on IV Lasix and guideline directed treatment for CHF


-Cardiology consulted and follow recommendation


-Hemoglobin is stable


-Patient is on heparin drip for non-STEMI





2/14/2021


-Cardiology evaluated the patient and put on optimal guideline directed medical 

therapy for CHF


-Patient is on milrinone drip


-Heparin changed to prophylaxis therapy by cardiology


-Patient is still complaining chest pain and will continue with as needed 

morphine.





2/15/2021.


Continue optimal guideline directed medical therapy for CHF.  


-Cardiology started a trial of intravenous milrinone.


-Continue aspirin, Coreg 12.5,g twice daily, Diovan 160 mg p.o. twice daily, 

Aldactone 25 mg p.o. daily and Lasix 40 mg IV twice daily.





2/16/2021.


-Continue IV milrinone per cardiology recommendations.


-Continue aspirin, Coreg 12.5,g twice daily, Diovan 160 mg p.o. twice daily, 

Aldactone 25 mg p.o. daily and Lasix 40 mg IV twice daily.





2/17/2021.  Continue to follow dietary restrictions, daily weight and strict I's

 and O's.  Continue aggressive edical therapy for decompensated heart failure 

including a trial of IV milrinone for an additional 24 hours per cardiology.  As

 outpatient, she will undergo further evaluation for advanced primary device 

therapy.  Anticipate discharge in a.m.





2/18/2021.  Patient completed trial of milrinone and was felt to have received 

maximal hospital benefit for discharge.  Patient will be discharged home on GDMT

 for CHF and antibiotics for tooth abscess.  Dedicated discharge time 35 

minutes.  Patient should follow-up with cardiology as an outpatient.  Patient 

should also follow-up with dentist at discharge.


Disposition: [id number] TO HOME OR SELFCARE


Time spent for discharge: 35





- Discharge Diagnoses


(1) Acute on chronic systolic heart failure


Status: Acute   





(2) Accelerated hypertension


Status: Acute   





(3) Acute respiratory failure


Status: Acute   


Qualifiers: 


   Respiratory failure complication: hypoxia   Qualified Code(s): J96.01 - Acute

 respiratory failure with hypoxia   





(4) Obesity hypoventilation syndrome


Status: Acute   





Core Measure Documentation





- Palliative Care


Palliative Care/ Comfort Measures: Not Applicable





- Core Measures


Any of the following diagnoses?: heart failure





- Heart Failure Discharge Requirements


ACE/ARB for LVSD if EF <40%: Yes


Beta blocker at discharge: Yes





Exam





- Constitutional


Vitals: 


                                        











Temp Pulse Resp BP Pulse Ox


 


 98.3 F   53 L  17   128/83   95 


 


 02/18/21 03:17  02/18/21 03:17  02/18/21 03:17  02/18/21 03:17  02/18/21 03:17











General appearance: Present: no acute distress, well-nourished





- EENT


Eyes: Present: PERRL


ENT: hearing intact, clear oral mucosa





- Neck


Neck: Present: supple, normal ROM





- Respiratory


Respiratory effort: normal


Respiratory: bilateral: CTA





- Cardiovascular


Heart Sounds: Present: S1 & S2.  Absent: rub, click





- Extremities


Extremities: pulses symmetrical, No edema


Peripheral Pulses: within normal limits





- Abdominal


General gastrointestinal: Present: soft, non-tender, non-distended, normal bowel

 sounds


Female genitourinary: Present: normal





- Integumentary


Integumentary: Present: clear, warm, dry





- Musculoskeletal


Musculoskeletal: gait normal, strength equal bilaterally





- Psychiatric


Psychiatric: appropriate mood/affect, intact judgment & insight





- Neurologic


Neurologic: CNII-XII intact, moves all extremities





Plan


Activity: advance as tolerated


Weight Bearing Status: Weight Bear as Tolerated


Diet: low fat, low cholesterol, low salt


Special Instructions: other (Follow-up with dentist)


Follow up with: 


ERINNE,SAMUEL [Other] - 3-5 Days


ROSENDA VIEYRA MD [Staff Physician] - 7 Days


Prescriptions: 


Spironolactone [Aldactone] 25 mg PO QDAY #30 tablet


carvediloL [Coreg] 12.5 mg PO BID #60 tablet


Valsartan [Diovan] 160 mg PO BID #60 tablet


metroNIDAZOLE [Flagyl TAB] 500 mg PO Q8HR #21 tablet


Aspirin EC [Halfprin EC] 81 mg PO DAILY #30 tablet


Furosemide [Lasix TAB] 40 mg PO BID #60 tablet


Amoxicillin [Trimox CAP] 500 mg PO Q8HR #21 capsule


Acetaminophen/Codeine [Tylenol /Codeine # 3 tab] 1 tab PO Q6H PRN #12 tab


 PRN Reason: pain

## 2021-09-22 ENCOUNTER — HOSPITAL ENCOUNTER (EMERGENCY)
Dept: HOSPITAL 5 - ED | Age: 41
LOS: 1 days | Discharge: HOME | End: 2021-09-23
Payer: MEDICAID

## 2021-09-22 DIAGNOSIS — Z88.8: ICD-10-CM

## 2021-09-22 DIAGNOSIS — K52.9: ICD-10-CM

## 2021-09-22 DIAGNOSIS — R10.9: ICD-10-CM

## 2021-09-22 DIAGNOSIS — F17.200: ICD-10-CM

## 2021-09-22 DIAGNOSIS — K59.00: ICD-10-CM

## 2021-09-22 DIAGNOSIS — O26.892: Primary | ICD-10-CM

## 2021-09-22 DIAGNOSIS — Z88.4: ICD-10-CM

## 2021-09-22 DIAGNOSIS — R10.13: ICD-10-CM

## 2021-09-22 DIAGNOSIS — Z3A.00: ICD-10-CM

## 2021-09-22 LAB
BASOPHILS # (AUTO): 0 K/MM3 (ref 0–0.1)
BASOPHILS NFR BLD AUTO: 0.7 % (ref 0–1.8)
EOSINOPHIL # BLD AUTO: 0.3 K/MM3 (ref 0–0.4)
EOSINOPHIL NFR BLD AUTO: 5.8 % (ref 0–4.3)
HCT VFR BLD CALC: 33.8 % (ref 30.3–42.9)
HGB BLD-MCNC: 11.1 GM/DL (ref 10.1–14.3)
LYMPHOCYTES # BLD AUTO: 2.1 K/MM3 (ref 1.2–5.4)
LYMPHOCYTES NFR BLD AUTO: 46.2 % (ref 13.4–35)
MCHC RBC AUTO-ENTMCNC: 33 % (ref 30–34)
MCV RBC AUTO: 81 FL (ref 79–97)
MONOCYTES # (AUTO): 0.3 K/MM3 (ref 0–0.8)
MONOCYTES % (AUTO): 7.6 % (ref 0–7.3)
PLATELET # BLD: 130 K/MM3 (ref 140–440)
RBC # BLD AUTO: 4.19 M/MM3 (ref 3.65–5.03)

## 2021-09-22 PROCEDURE — 83690 ASSAY OF LIPASE: CPT

## 2021-09-22 PROCEDURE — 99284 EMERGENCY DEPT VISIT MOD MDM: CPT

## 2021-09-22 PROCEDURE — 96361 HYDRATE IV INFUSION ADD-ON: CPT

## 2021-09-22 PROCEDURE — 36415 COLL VENOUS BLD VENIPUNCTURE: CPT

## 2021-09-22 PROCEDURE — 80048 BASIC METABOLIC PNL TOTAL CA: CPT

## 2021-09-22 PROCEDURE — 74177 CT ABD & PELVIS W/CONTRAST: CPT

## 2021-09-22 PROCEDURE — 85025 COMPLETE CBC W/AUTO DIFF WBC: CPT

## 2021-09-22 PROCEDURE — 96375 TX/PRO/DX INJ NEW DRUG ADDON: CPT

## 2021-09-22 PROCEDURE — 80076 HEPATIC FUNCTION PANEL: CPT

## 2021-09-22 PROCEDURE — 96374 THER/PROPH/DIAG INJ IV PUSH: CPT

## 2021-09-22 PROCEDURE — 84703 CHORIONIC GONADOTROPIN ASSAY: CPT

## 2021-09-23 VITALS — DIASTOLIC BLOOD PRESSURE: 90 MMHG | SYSTOLIC BLOOD PRESSURE: 150 MMHG

## 2021-09-23 LAB
ALBUMIN SERPL-MCNC: 3.7 G/DL (ref 3.9–5)
ALT SERPL-CCNC: 14 UNITS/L (ref 7–56)
BILIRUB DIRECT SERPL-MCNC: < 0.2 MG/DL (ref 0–0.2)
BUN SERPL-MCNC: 15 MG/DL (ref 7–17)
BUN/CREAT SERPL: 15 %
CALCIUM SERPL-MCNC: 9.4 MG/DL (ref 8.4–10.2)
HEMOLYSIS INDEX: 8

## 2021-09-23 NOTE — CAT SCAN REPORT
CT abdomen pelvis w con



INDICATION / CLINICAL INFORMATION: Intermittent abdominal pain x 2 weeks. Last BM 4 days ago..



TECHNIQUE: Axial CT images were obtained through the abdomen and pelvis after 100 cc Omnipaque 300 IV
 contrast.  All CT scans at this location are performed using CT dose reduction for ALARA by means of
 automated exposure control. 



COMPARISON: CT from 2/3/2021.



FINDINGS:



LOWER CHEST: No significant abnormality

LIVER: No significant abnormality

GALLBLADDER/BILIARY TREE: Cholecystectomy.  

PANCREAS: No significant abnormality

SPLEEN: No significant abnormality

ADRENALS: No significant abnormality

KIDNEYS / URETER: No significant abnormality

URINARY BLADDER: No significant abnormality

REPRODUCTIVE ORGANS: Endometrial fluid/thickening is likely physiologic. There are 2 left adnexal cys
ts, largest measuring 2.9 cm. No further follow-up is required.

STOMACH / BOWEL: Mural thickening involving several loops of nondilated fluid-filled small bowel thro
ughout the abdomen and pelvis. Large colonic stool burden. No evidence of obstruction. The appendix i
s normal in caliber.  

LYMPH NODES: No significant adenopathy.

VASCULATURE: No significant abnormality. 

OTHER: No free air, free fluid, or focal fluid collection is identified.



SKELETAL SYSTEM: No acute osseous findings.



IMPRESSION:



1. Mural thickening associated with multiple loops of nondilated fluid-filled small bowel in the abdo
men and pelvis, compatible with infectious or inflammatory enteritis.

2. Large colonic stool burden, compatible constipation.

3. Endotracheal fluid/thickening with 2 left adnexal cysts, likely physiologic. No routine follow-up 
is required.

4. Other incidental findings as above.



Signer Name: Calixto Jc MD 

Signed: 9/23/2021 12:59 AM

Workstation Name: [email or web address]-

## 2021-09-23 NOTE — EMERGENCY DEPARTMENT REPORT
ED Abdominal Pain HPI





- General


Chief Complaint: Abdominal Pain


Stated Complaint: ABD PAIN, TOOTH PAIN


Time Seen by Provider: 21 23:06


Source: patient


Mode of arrival: Ambulatory


Limitations: No Limitations





- History of Present Illness


Initial Comments: 





41-year-old female [facility] emerge department complaining of a 2-week history of 

waxing and waning abdominal pain and a crampy crampy dull fashion associated 

with no bowel movement over the last 4 to 5 days as well.  She reports no fever,

chills, sweats.  No hematuria no dysuria, no hemoptysis no hematemesis 

hematochezia.  She reports no traumatic injuries.  No no vaginal bleeding.  No 

suspicion for pregnancy.


MD Complaint: abdominal pain


-: Sudden


Location: diffuse


Radiation: none


Migration to: no migration


Quality: stabbing, aching, sharp


Consistency: constant


Improves With: nothing, other (fetal position helps)


Worsens With: movement


Associated Symptoms: nausea, constipation, other.  denies: diarrhea, melena, 

hematuria, anorexia





- Related Data


                                  Previous Rx's











 Medication  Instructions  Recorded  Last Taken  Type


 


medroxyPROGESTERone ACETATE 10 mg PO QDAY 10 Days #10 tablet 20 Unknown Rx





[Provera]    


 


Acetaminophen/Codeine [Tylenol 1 tab PO Q6H PRN #12 tab 21 Unknown Rx





/Codeine # 3 tab]    


 


Amoxicillin [Trimox CAP] 500 mg PO Q8HR #21 capsule 21 Unknown Rx


 


Aspirin EC [Halfprin EC] 81 mg PO DAILY #30 tablet 21 Unknown Rx


 


Furosemide [Lasix TAB] 40 mg PO BID #60 tablet 21 Unknown Rx


 


Spironolactone [Aldactone] 25 mg PO QDAY  tablet 21 Unknown Rx


 


Spironolactone [Aldactone] 25 mg PO QDAY #30 tablet 21 Unknown Rx


 


Valsartan [Diovan] 160 mg PO BID #60 tablet 21 Unknown Rx


 


carvediloL [Coreg] 12.5 mg PO BID #60 tablet 21 Unknown Rx


 


metroNIDAZOLE [Flagyl TAB] 500 mg PO Q8HR #21 tablet 21 Unknown Rx


 


Ciprofloxacin HCl 500 mg PO BID #20 tablet 21 Unknown Rx


 


Hyoscyamine Subl [Levsin Sl] 0.125 mg SL Q4HR PRN #16 tablet 21 Unknown Rx


 


Lactulose [Cephulac] 30 gm PO Q6HR #450 ml 21 Unknown Rx


 


metroNIDAZOLE [Flagyl] 500 mg PO Q12HR #28 tab 21 Unknown Rx











                                    Allergies











Allergy/AdvReac Type Severity Reaction Status Date / Time


 


cyclobenzaprine HCl Allergy  Shortness Verified 21 22:36





[From Flexeril]   of Breath  


 


ibuprofen [From Motrin] Allergy  Swelling Verified 21 22:36


 


EPIDURAL MEDS Allergy  Shortness Uncoded 20 17:10





   of Breath  














ED Review of Systems


ROS: 


Stated complaint: ABD PAIN, TOOTH PAIN


Other details as noted in HPI





Comment: All other systems reviewed and negative





ED Past Medical Hx





- Past Medical History


Previous Medical History?: No


Hx Hypertension: Yes


Hx CVA: No


Hx Heart Attack/AMI: No


Hx Congestive Heart Failure: Yes


Hx Diabetes: No


Hx Deep Vein Thrombosis: No


Hx Pulmonary Embolism: No


Hx GERD: No


Hx Liver Disease: No


Hx Renal Disease: No


Hx Sickle Cell Disease: No


Hx Arthritis: No


Hx Headaches / Migraines: No


Hx Seizures: No


Hx Kidney Stones: No


Hx Psychiatric Treatment: No


Hx Asthma: No


Hx COPD: No


Hx Tuberculosis: No


Hx Dementia: No


Hx HIV: No





- Surgical History


Hx Coronary Stent: No


Hx Open Heart Surgery: No


Hx Pacemaker: No


Hx Internal Defibrillator: No


Hx Cholecystectomy: Yes


Hx Appendectomy: No


Hx Breast Surgery: No


Additional Surgical History: "Ovarian surgery",.   x 4.  TUBAL LIGATION





- Social History


Smoking Status: Current Every Day Smoker





- Medications


Home Medications: 


                                Home Medications











 Medication  Instructions  Recorded  Confirmed  Last Taken  Type


 


medroxyPROGESTERone ACETATE 10 mg PO QDAY 10 Days #10 tablet 20 

Unknown Rx





[Provera]     


 


Acetaminophen/Codeine [Tylenol 1 tab PO Q6H PRN #12 tab 21  Unknown Rx





/Codeine # 3 tab]     


 


Amoxicillin [Trimox CAP] 500 mg PO Q8HR #21 capsule 21  Unknown Rx


 


Aspirin EC [Halfprin EC] 81 mg PO DAILY #30 tablet 21  Unknown Rx


 


Furosemide [Lasix TAB] 40 mg PO BID #60 tablet 21  Unknown Rx


 


Spironolactone [Aldactone] 25 mg PO QDAY  tablet 21  Unknown Rx


 


Spironolactone [Aldactone] 25 mg PO QDAY #30 tablet 21  Unknown Rx


 


Valsartan [Diovan] 160 mg PO BID #60 tablet 21  Unknown Rx


 


carvediloL [Coreg] 12.5 mg PO BID #60 tablet 21  Unknown Rx


 


metroNIDAZOLE [Flagyl TAB] 500 mg PO Q8HR #21 tablet 21  Unknown Rx


 


Ciprofloxacin HCl 500 mg PO BID #20 tablet 21  Unknown Rx


 


Hyoscyamine Subl [Levsin Sl] 0.125 mg SL Q4HR PRN #16 tablet 21  Unknown 

Rx


 


Lactulose [Cephulac] 30 gm PO Q6HR #450 ml 21  Unknown Rx


 


metroNIDAZOLE [Flagyl] 500 mg PO Q12HR #28 tab 21  Unknown Rx














ED Physical Exam





- General


Limitations: No Limitations


General appearance: alert, in no apparent distress





- Head


Head exam: Present: atraumatic, normocephalic





- Eye


Eye exam: Present: normal appearance, PERRL, EOMI


Pupils: Present: normal accommodation





- ENT


ENT exam: Present: mucous membranes moist





- Neck


Neck exam: Present: normal inspection





- Respiratory


Respiratory exam: Present: normal lung sounds bilaterally.  Absent: respiratory 

distress





- Cardiovascular


Cardiovascular Exam: Present: regular rate, normal rhythm.  Absent: systolic 

murmur, diastolic murmur, rubs, gallop





- GI/Abdominal


GI/Abdominal exam: Present: soft, tenderness (No rebound, no McBurney's point 

tenderness), normal bowel sounds





- Extremities Exam


Extremities exam: Present: normal inspection, full ROM, normal capillary refill





- Back Exam


Back exam: Present: normal inspection.  Absent: CVA tenderness (R), CVA 

tenderness (L)





- Neurological Exam


Neurological exam: Present: alert, oriented X3, CN II-XII intact





- Psychiatric


Psychiatric exam: Present: normal affect, normal mood





- Skin


Skin exam: Present: warm, dry, intact, normal color.  Absent: rash





ED Course


                                   Vital Signs











  21





  22:32


 


Temperature 98.7 F


 


Pulse Rate 89


 


Blood Pressure 163/111














ED Medical Decision Making





- Lab Data


Result diagrams: 


                                 21 23:24





                                 21 23:24


Critical care attestation.: 


If time is entered above; I have spent that time in minutes in the direct care 

of this critically ill patient, excluding procedure time.








ED Disposition


Clinical Impression: 


 Abdominal pain, Colitis, Acute constipation





Disposition:  HOME / SELF CARE / HOMELESS


Is pt being admited?: No


Does the pt Need Aspirin: No


Condition: Stable


Instructions:  Abdominal Pain (ED), Colitis, Constipation, Adult, Easy-to-Read, 

Food Choices to Help Relieve Diarrhea, Adult, Gastrointestinal Bleeding, Viral 

Gastroenteritis, Adult, Constipation, Adult, Abdominal Pain, Adult


Additional Instructions: 


You have been evaluated emergency department today for abdominal pain.  Your 

evaluation did not show evidence of any medical conditions requiring emergent 

intervention at this time.  Your lipase was it was elevated but not to a si

gnificant degree significant pancreatitis does not appear to be present at this 

time please drink plenty of fluids.  





Please schedule an appointment with your primary care physician.  





Return to emergency department if you experience worsening uncontrolled pain, 

fevers of 100.4 or greater, recurrent vomiting, inability to tolerate food or 

fluids by mouth, bloody stools or vomit, black tarry stools, or any other 

concerning symptoms.


Prescriptions: 


Lactulose [Cephulac] 30 gm PO Q6HR #450 ml


Ciprofloxacin HCl 500 mg PO BID #20 tablet


metroNIDAZOLE [Flagyl] 500 mg PO Q12HR #28 tab


Hyoscyamine Subl [Levsin Sl] 0.125 mg SL Q4HR PRN #16 tablet


 PRN Reason: Spasms


Referrals: 


[facility] [Provider Group] - 3-5 Days


[facility] [Provider Group] - 3-5 Days


AVRIL PARK MD [Staff Physician] - 3-5 Days

## 2022-01-24 ENCOUNTER — HOSPITAL ENCOUNTER (EMERGENCY)
Dept: HOSPITAL 5 - ED | Age: 42
LOS: 1 days | Discharge: HOME | End: 2022-01-25
Payer: MEDICAID

## 2022-01-24 VITALS — SYSTOLIC BLOOD PRESSURE: 109 MMHG | DIASTOLIC BLOOD PRESSURE: 90 MMHG

## 2022-01-24 DIAGNOSIS — E11.9: ICD-10-CM

## 2022-01-24 DIAGNOSIS — Z88.6: ICD-10-CM

## 2022-01-24 DIAGNOSIS — Z88.8: ICD-10-CM

## 2022-01-24 DIAGNOSIS — Z88.4: ICD-10-CM

## 2022-01-24 DIAGNOSIS — F17.200: ICD-10-CM

## 2022-01-24 DIAGNOSIS — K02.9: ICD-10-CM

## 2022-01-24 DIAGNOSIS — K04.7: Primary | ICD-10-CM

## 2022-01-24 PROCEDURE — 99282 EMERGENCY DEPT VISIT SF MDM: CPT

## 2022-08-09 ENCOUNTER — HOSPITAL ENCOUNTER (EMERGENCY)
Dept: HOSPITAL 5 - ED | Age: 42
Discharge: HOME | End: 2022-08-09
Payer: MEDICAID

## 2022-08-09 VITALS — DIASTOLIC BLOOD PRESSURE: 68 MMHG | SYSTOLIC BLOOD PRESSURE: 132 MMHG

## 2022-08-09 DIAGNOSIS — K05.10: ICD-10-CM

## 2022-08-09 DIAGNOSIS — I10: ICD-10-CM

## 2022-08-09 DIAGNOSIS — F17.200: ICD-10-CM

## 2022-08-09 DIAGNOSIS — K02.9: ICD-10-CM

## 2022-08-09 DIAGNOSIS — K04.7: Primary | ICD-10-CM

## 2022-08-09 PROCEDURE — 99282 EMERGENCY DEPT VISIT SF MDM: CPT

## 2022-08-09 NOTE — EMERGENCY DEPARTMENT REPORT
ED General Adult HPI





- General


Chief complaint: Dental/Oral


Stated complaint: INFECTED TOOTH


Source: patient


Mode of arrival: Ambulatory


Limitations: No Limitations





- History of Present Illness


Initial comments: 





Patient is a 42-year-old -American female with a history of hypertension,

non-insulin-dependent diabetes and CHF who presents to the ED with complaint of 

acute onset persistent right mandibular maxillary premolar and molar tooth ache 

with swollen gums for the last 2 weeks.  Patient states that she has been taking

over-the-counter medication with no relief.  Patient states that she was 

initially scheduled for a dental procedure but because of back CHF condition, 

the dentist decided to postpone the procedure pending further evaluation by her 

cardiologist.  Patient denies fever, chills, nausea, vomiting, chest pain, 

shortness of breath, dizziness, syncope, sore throat, change in vision, neck 

pain, headache or hearing loss.


MD Complaint: Right maxillary gingival pain, swelling; maxillary premolar and 

molar pain


-: Gradual, week(s) (2)


Location: mouth


Radiation: non-radiation


Severity scale (0 -10): 9


Quality: aching, sharp


Consistency: constant


Improves with: none


Worsens with: none


Associated Symptoms: denies other symptoms.  denies: confusion, chest pain, 

cough, diaphoresis, fever/chills, headaches, loss of appetite, malaise, 

nausea/vomiting, rash, seizure, shortness of breath, syncope, weakness, other


Treatments Prior to Arrival: none





- Related Data


                                Home Medications











 Medication  Instructions  Recorded  Confirmed  Last Taken


 


Ferrous Sulfate [Iron 325 MG] 325 mg PO QDAY 22


 


Hydralazine HCl 50 mg PO BID 22


 


NIFEdipine [Nifedipine ER] 90 mg PO QDAY 22


 


Pantoprazole [Protonix] 40 mg PO QDAY 22


 


Sacubitril/Valsartan [Entresto 1 tab PO BID 22





49-51 mg]    


 


Torsemide [Demadex] 20 mg PO QDAY 22


 


Valsartan [Diovan] 320 mg PO QDAY 0122


 


traMADoL [Ultram] 50 mg PO Q4HR PRN 22








                                  Previous Rx's











 Medication  Instructions  Recorded  Last Taken  Type


 


Spironolactone [Aldactone] 25 mg PO QDAY  tablet 21 Rx


 


carvediloL [Coreg] 12.5 mg PO BID #60 tablet 21 Rx


 


Clindamycin [Clindamycin CAP] 300 mg PO Q8H #21 cap 22 Unknown Rx


 


traMADoL [Ultram] 50 mg PO Q6HR PRN #12 tablet 22 Unknown Rx


 


Acetaminophen [Tylenol] 500 mg PO Q6HR PRN #40 tablet 22 Unknown Rx


 


Clindamycin [Clindamycin CAP] 300 mg PO Q6H #40 cap 22 Unknown Rx


 


traMADoL [Ultram] 50 mg PO Q6HR PRN #12 tablet 22 Unknown Rx











                                    Allergies











Allergy/AdvReac Type Severity Reaction Status Date / Time


 


cyclobenzaprine HCl Allergy  Shortness Verified 22 13:50





[From Flexeril]   of Breath;  





   confusion  


 


ibuprofen [From Motrin] Allergy  Swelling Verified 22 18:03


 


EPIDURAL MEDS Allergy  Shortness Uncoded 22 18:03





   of Breath  














ED Review of Systems


ROS: 


Stated complaint: INFECTED TOOTH


Other details as noted in HPI





Constitutional: denies: chills, fever


Eyes: denies: eye pain, eye discharge, vision change


ENT: dental pain (right maxillary premolar and molar pain), other (swollen 

painful gums).  denies: ear pain, throat pain


Respiratory: denies: cough, shortness of breath, wheezing


Cardiovascular: denies: chest pain, palpitations


Endocrine: no symptoms reported


Gastrointestinal: denies: abdominal pain, nausea, vomiting, diarrhea


Genitourinary: denies: urgency, dysuria, discharge


Musculoskeletal: denies: back pain, joint swelling, arthralgia


Skin: denies: rash, lesions


Neurological: denies: headache, weakness, paresthesias


Psychiatric: denies: anxiety, depression


Hematological/Lymphatic: denies: easy bleeding, easy bruising





ED Past Medical Hx





- Past Medical History


Hx Hypertension: Yes


Hx CVA: No


Hx Heart Attack/AMI: No


Hx Congestive Heart Failure: Yes


Hx Diabetes: Yes


Hx Deep Vein Thrombosis: No


Hx Pulmonary Embolism: No


Hx GERD: No


Hx Liver Disease: No


Hx Renal Disease: No


Hx Sickle Cell Disease: No


Hx Arthritis: No


Hx Headaches / Migraines: No


Hx Seizures: No


Hx Kidney Stones: No


Hx Psychiatric Treatment: No


Hx Asthma: No


Hx COPD: No


Hx Tuberculosis: No


Hx Dementia: No


Hx HIV: No





- Surgical History


Hx Coronary Stent: No


Hx Open Heart Surgery: No


Hx Pacemaker: Yes


Hx Internal Defibrillator: No


Hx Cholecystectomy: Yes


Hx Appendectomy: No


Hx Breast Surgery: No


Additional Surgical History: "Ovarian surgery",.   x 4.  TUBAL LIGATION





- Social History


Smoking Status: Current Every Day Smoker





- Medications


Home Medications: 


                                Home Medications











 Medication  Instructions  Recorded  Confirmed  Last Taken  Type


 


Spironolactone [Aldactone] 25 mg PO QDAY  tablet 21 Rx


 


carvediloL [Coreg] 12.5 mg PO BID #60 tablet 21 Rx


 


Ferrous Sulfate [Iron 325 MG] 325 mg PO QDAY 22 History


 


Hydralazine HCl 50 mg PO BID 22 History


 


NIFEdipine [Nifedipine ER] 90 mg PO QDAY 22 History


 


Pantoprazole [Protonix] 40 mg PO QDAY 22 History


 


Sacubitril/Valsartan [Entresto 1 tab PO BID 22 History





49-51 mg]     


 


Torsemide [Demadex] 20 mg PO QDAY 22 History


 


Valsartan [Diovan] 320 mg PO QDAY 22 History


 


traMADoL [Ultram] 50 mg PO Q4HR PRN 22 History


 


Clindamycin [Clindamycin CAP] 300 mg PO Q8H #21 cap 22  Unknown Rx


 


traMADoL [Ultram] 50 mg PO Q6HR PRN #12 tablet 22  Unknown Rx


 


Acetaminophen [Tylenol] 500 mg PO Q6HR PRN #40 tablet 22  Unknown Rx


 


Clindamycin [Clindamycin CAP] 300 mg PO Q6H #40 cap 22  Unknown Rx


 


traMADoL [Ultram] 50 mg PO Q6HR PRN #12 tablet 22  Unknown Rx














ED Physical Exam





- General


Limitations: No Limitations


General appearance: alert, in no apparent distress





- Head


Head exam: Present: atraumatic, normocephalic, normal inspection





- Eye


Eye exam: Present: normal appearance, PERRL, EOMI


Pupils: Present: normal accommodation





- ENT


ENT exam: Present: mucous membranes moist, TM's normal bilaterally, normal 

external ear exam, other (Swollen, tender right maxillary gingiva; palpable 

tender right maxillary premolar and molar teeth; multiple diffuse chronic dental

 caries)





- Neck


Neck exam: Present: normal inspection, full ROM.  Absent: tenderness





- Respiratory


Respiratory exam: Present: normal lung sounds bilaterally.  Absent: respiratory 

distress, wheezes, rales, rhonchi, stridor, chest wall tenderness, accessory 

muscle use, decreased breath sounds, prolonged expiratory





- Cardiovascular


Cardiovascular Exam: Present: regular rate, normal rhythm, normal heart sounds. 

 Absent: systolic murmur, diastolic murmur, rubs, gallop





- GI/Abdominal


GI/Abdominal exam: Present: soft, normal bowel sounds.  Absent: tenderness, 

guarding, rebound, hyperactive bowel sounds, hypoactive bowel sounds, 

organomegaly





- Extremities Exam


Extremities exam: Present: normal inspection, full ROM, normal capillary refill.

  Absent: tenderness





- Back Exam


Back exam: Present: normal inspection, full ROM.  Absent: tenderness, CVA 

tenderness (R), CVA tenderness (L), muscle spasm, paraspinal tenderness, 

vertebral tenderness





- Neurological Exam


Neurological exam: Present: alert, oriented X3, CN II-XII intact, normal gait, 

reflexes normal





- Psychiatric


Psychiatric exam: Present: normal affect, normal mood





- Skin


Skin exam: Present: warm, dry, intact, normal color.  Absent: rash





ED Course


                                   Vital Signs











  22





  13:27 22:02 22:36


 


Temperature 98.9 F  


 


Pulse Rate 52 L  74


 


Respiratory 14 16 17





Rate   


 


Blood Pressure 123/75  132/68





[Right]   


 


O2 Sat by Pulse 99  95





Oximetry   














ED Medical Decision Making





- Medical Decision Making





This is a 42-year-old -American female with a history of hypertension, 

non-insulin-dependent diabetes and CHF who presents to the ED with complaint of 

acute onset persistent right mandibular maxillary premolar and molar tooth ache 

with swollen gums for the last 2 weeks.  Patient states that she has been taking

 over-the-counter medication with no relief.  Patient states that she was 

initially scheduled for a dental procedure but because of back CHF condition, 

the dentist decided to postpone the procedure pending further evaluation by her 

cardiologist.  In the ED, patient is alert and oriented x3 and is not in any 

distress.  Patient is hemodynamically stable.  Patient was treated for pain in 

the ED and also given initial oral antibiotics.  Patient was discharged home on 

pain medications and antibiotics and advised to follow-up with her dentist in 7 

to 10 days for reevaluation or return to the ED immediately if symptoms get 

worse.





- Differential Diagnosis


Dental abscess; dental caries; gingivitis


Critical care attestation.: 


If time is entered above; I have spent that time in minutes in the direct care 

of this critically ill patient, excluding procedure time.








ED Disposition


Clinical Impression: 


 Dental abscess, Dental caries, Acute gingivitis





Disposition: 01 HOME / SELF CARE / HOMELESS


Is pt being admited?: No


Does the pt Need Aspirin: No


Condition: Stable


Instructions:  Dental Abscess, Easy-to-Read, Trench Mouth, Dental Extraction, 

Care After, Easy-to-Read


Additional Instructions: 


Take medication with food, drink plenty of fluids, follow-up with your dentist 

in 7 to 10 days for reevaluation.  Return to the ED immediately if symptoms get 

worse.


Prescriptions: 


Acetaminophen [Tylenol] 500 mg PO Q6HR PRN #40 tablet


 PRN Reason: Pain , Severe (7-10)


Clindamycin [Clindamycin CAP] 300 mg PO Q6H #40 cap


traMADoL [Ultram] 50 mg PO Q6HR PRN #12 tablet


 PRN Reason: Pain


Referrals: 


[facility] [Outside] - 7-10 days


Time of Disposition: 22:23


Print Language: ENGLISH